# Patient Record
Sex: FEMALE | Race: WHITE | NOT HISPANIC OR LATINO | Employment: FULL TIME | URBAN - METROPOLITAN AREA
[De-identification: names, ages, dates, MRNs, and addresses within clinical notes are randomized per-mention and may not be internally consistent; named-entity substitution may affect disease eponyms.]

---

## 2019-02-06 ENCOUNTER — HISTORICAL (OUTPATIENT)
Dept: ADMINISTRATIVE | Facility: HOSPITAL | Age: 29
End: 2019-02-06

## 2020-05-05 ENCOUNTER — HISTORICAL (OUTPATIENT)
Dept: LAB | Facility: HOSPITAL | Age: 30
End: 2020-05-05

## 2020-09-30 ENCOUNTER — HISTORICAL (OUTPATIENT)
Dept: RADIOLOGY | Facility: HOSPITAL | Age: 30
End: 2020-09-30

## 2020-10-31 ENCOUNTER — HISTORICAL (OUTPATIENT)
Dept: LAB | Facility: HOSPITAL | Age: 30
End: 2020-10-31

## 2020-11-12 ENCOUNTER — HISTORICAL (OUTPATIENT)
Dept: LAB | Facility: HOSPITAL | Age: 30
End: 2020-11-12

## 2020-11-24 ENCOUNTER — HISTORICAL (OUTPATIENT)
Dept: LAB | Facility: HOSPITAL | Age: 30
End: 2020-11-24

## 2021-04-14 ENCOUNTER — HISTORICAL (OUTPATIENT)
Dept: ADMINISTRATIVE | Facility: HOSPITAL | Age: 31
End: 2021-04-14

## 2021-05-07 ENCOUNTER — HISTORICAL (OUTPATIENT)
Dept: ADMINISTRATIVE | Facility: HOSPITAL | Age: 31
End: 2021-05-07

## 2021-05-21 ENCOUNTER — HISTORICAL (OUTPATIENT)
Dept: LAB | Facility: HOSPITAL | Age: 31
End: 2021-05-21

## 2021-11-23 ENCOUNTER — HISTORICAL (OUTPATIENT)
Dept: RADIOLOGY | Facility: HOSPITAL | Age: 31
End: 2021-11-23

## 2022-02-03 ENCOUNTER — HISTORICAL (OUTPATIENT)
Dept: ADMINISTRATIVE | Facility: HOSPITAL | Age: 32
End: 2022-02-03

## 2022-02-03 LAB
FLUAV AG UPPER RESP QL IA.RAPID: NEGATIVE
FLUBV AG UPPER RESP QL IA.RAPID: NEGATIVE
SARS-COV-2 RNA RESP QL NAA+PROBE: NOT DETECTED

## 2022-04-07 ENCOUNTER — HISTORICAL (OUTPATIENT)
Dept: ADMINISTRATIVE | Facility: HOSPITAL | Age: 32
End: 2022-04-07
Payer: COMMERCIAL

## 2022-04-12 LAB
PAP RECOMMENDATION EXT: NORMAL
PAP SMEAR: NORMAL

## 2022-04-23 VITALS
DIASTOLIC BLOOD PRESSURE: 88 MMHG | SYSTOLIC BLOOD PRESSURE: 134 MMHG | BODY MASS INDEX: 45.7 KG/M2 | OXYGEN SATURATION: 99 % | WEIGHT: 284.38 LBS | HEIGHT: 66 IN

## 2022-04-27 ENCOUNTER — HISTORICAL (OUTPATIENT)
Dept: ADMINISTRATIVE | Facility: HOSPITAL | Age: 32
End: 2022-04-27
Payer: COMMERCIAL

## 2022-04-27 ENCOUNTER — HISTORICAL (OUTPATIENT)
Dept: RADIOLOGY | Facility: HOSPITAL | Age: 32
End: 2022-04-27
Payer: COMMERCIAL

## 2022-05-23 ENCOUNTER — HOSPITAL ENCOUNTER (OUTPATIENT)
Dept: RADIOLOGY | Facility: HOSPITAL | Age: 32
Discharge: HOME | End: 2022-05-23
Attending: OBSTETRICS & GYNECOLOGY
Payer: COMMERCIAL

## 2022-05-23 VITALS — WEIGHT: 250 LBS | HEIGHT: 66 IN | BODY MASS INDEX: 40.18 KG/M2

## 2022-05-23 DIAGNOSIS — R92.8 ABNORMAL MAMMOGRAM: ICD-10-CM

## 2022-05-23 PROCEDURE — 76642 ULTRASOUND BREAST LIMITED: CPT | Mod: 26,RT,, | Performed by: RADIOLOGY

## 2022-05-23 PROCEDURE — 76642 ULTRASOUND BREAST LIMITED: CPT | Mod: TC,RT

## 2022-05-23 PROCEDURE — 76642 US BREAST RIGHT LIMITED: ICD-10-PCS | Mod: 26,RT,, | Performed by: RADIOLOGY

## 2022-05-23 PROCEDURE — 77065 DX MAMMO INCL CAD UNI: CPT | Mod: 26,RT,, | Performed by: RADIOLOGY

## 2022-05-23 PROCEDURE — 77061 BREAST TOMOSYNTHESIS UNI: CPT | Mod: 26,RT,, | Performed by: RADIOLOGY

## 2022-05-23 PROCEDURE — 77065 MAMMO DIGITAL DIAGNOSTIC RIGHT WITH TOMO: ICD-10-PCS | Mod: 26,RT,, | Performed by: RADIOLOGY

## 2022-05-23 PROCEDURE — 77065 DX MAMMO INCL CAD UNI: CPT | Mod: TC,RT

## 2022-05-23 PROCEDURE — 77061 MAMMO DIGITAL DIAGNOSTIC RIGHT WITH TOMO: ICD-10-PCS | Mod: 26,RT,, | Performed by: RADIOLOGY

## 2022-06-07 DIAGNOSIS — Z91.89 AT HIGH RISK FOR BREAST CANCER: Primary | ICD-10-CM

## 2022-06-10 ENCOUNTER — OCCUPATIONAL HEALTH (OUTPATIENT)
Dept: URGENT CARE | Facility: CLINIC | Age: 32
End: 2022-06-10
Payer: COMMERCIAL

## 2022-06-10 VITALS — RESPIRATION RATE: 18 BRPM | OXYGEN SATURATION: 97 %

## 2022-06-10 DIAGNOSIS — R05.9 COUGH: Primary | ICD-10-CM

## 2022-06-10 LAB
CTP QC/QA: YES
SARS-COV-2 RDRP RESP QL NAA+PROBE: POSITIVE

## 2022-06-10 PROCEDURE — U0002 COVID-19 LAB TEST NON-CDC: HCPCS | Mod: QW,,, | Performed by: FAMILY MEDICINE

## 2022-06-10 PROCEDURE — U0002: ICD-10-PCS | Mod: QW,,, | Performed by: FAMILY MEDICINE

## 2022-06-10 NOTE — PROGRESS NOTES
Pt being swabbed for COVID as per employee health. Specimen collected by staff and being tested per protocols of [facility] [place] Pt will be notified of results.

## 2022-07-15 ENCOUNTER — OFFICE VISIT (OUTPATIENT)
Dept: URGENT CARE | Facility: CLINIC | Age: 32
End: 2022-07-15
Payer: COMMERCIAL

## 2022-07-15 VITALS
OXYGEN SATURATION: 97 % | SYSTOLIC BLOOD PRESSURE: 137 MMHG | WEIGHT: 250 LBS | BODY MASS INDEX: 40.18 KG/M2 | TEMPERATURE: 98 F | DIASTOLIC BLOOD PRESSURE: 89 MMHG | HEART RATE: 69 BPM | HEIGHT: 66 IN | RESPIRATION RATE: 18 BRPM

## 2022-07-15 DIAGNOSIS — J02.0 STREP THROAT: ICD-10-CM

## 2022-07-15 DIAGNOSIS — J02.9 SORE THROAT: Primary | ICD-10-CM

## 2022-07-15 LAB
CTP QC/QA: YES
S PYO RRNA THROAT QL PROBE: POSITIVE

## 2022-07-15 PROCEDURE — 1159F MED LIST DOCD IN RCRD: CPT | Mod: CPTII,,, | Performed by: NURSE PRACTITIONER

## 2022-07-15 PROCEDURE — 1160F PR REVIEW ALL MEDS BY PRESCRIBER/CLIN PHARMACIST DOCUMENTED: ICD-10-PCS | Mod: CPTII,,, | Performed by: NURSE PRACTITIONER

## 2022-07-15 PROCEDURE — 3008F PR BODY MASS INDEX (BMI) DOCUMENTED: ICD-10-PCS | Mod: CPTII,,, | Performed by: NURSE PRACTITIONER

## 2022-07-15 PROCEDURE — 87880 POCT RAPID STREP A: ICD-10-PCS | Mod: QW,,, | Performed by: NURSE PRACTITIONER

## 2022-07-15 PROCEDURE — 3075F SYST BP GE 130 - 139MM HG: CPT | Mod: CPTII,,, | Performed by: NURSE PRACTITIONER

## 2022-07-15 PROCEDURE — 3008F BODY MASS INDEX DOCD: CPT | Mod: CPTII,,, | Performed by: NURSE PRACTITIONER

## 2022-07-15 PROCEDURE — 87880 STREP A ASSAY W/OPTIC: CPT | Mod: QW,,, | Performed by: NURSE PRACTITIONER

## 2022-07-15 PROCEDURE — 1160F RVW MEDS BY RX/DR IN RCRD: CPT | Mod: CPTII,,, | Performed by: NURSE PRACTITIONER

## 2022-07-15 PROCEDURE — 1159F PR MEDICATION LIST DOCUMENTED IN MEDICAL RECORD: ICD-10-PCS | Mod: CPTII,,, | Performed by: NURSE PRACTITIONER

## 2022-07-15 PROCEDURE — 99203 OFFICE O/P NEW LOW 30 MIN: CPT | Mod: 25,,, | Performed by: NURSE PRACTITIONER

## 2022-07-15 PROCEDURE — 3075F PR MOST RECENT SYSTOLIC BLOOD PRESS GE 130-139MM HG: ICD-10-PCS | Mod: CPTII,,, | Performed by: NURSE PRACTITIONER

## 2022-07-15 PROCEDURE — 99203 PR OFFICE/OUTPT VISIT, NEW, LEVL III, 30-44 MIN: ICD-10-PCS | Mod: 25,,, | Performed by: NURSE PRACTITIONER

## 2022-07-15 PROCEDURE — 3079F PR MOST RECENT DIASTOLIC BLOOD PRESSURE 80-89 MM HG: ICD-10-PCS | Mod: CPTII,,, | Performed by: NURSE PRACTITIONER

## 2022-07-15 PROCEDURE — 3079F DIAST BP 80-89 MM HG: CPT | Mod: CPTII,,, | Performed by: NURSE PRACTITIONER

## 2022-07-15 RX ORDER — PROPRANOLOL HYDROCHLORIDE 60 MG/1
60 CAPSULE, EXTENDED RELEASE ORAL DAILY
COMMUNITY
Start: 2022-06-17 | End: 2023-01-05 | Stop reason: SDUPTHER

## 2022-07-15 RX ORDER — AZITHROMYCIN 250 MG/1
TABLET, FILM COATED ORAL
Qty: 6 TABLET | Refills: 0 | Status: SHIPPED | OUTPATIENT
Start: 2022-07-15 | End: 2022-08-02

## 2022-07-15 RX ORDER — LEVOTHYROXINE SODIUM 200 UG/1
200 TABLET ORAL
COMMUNITY
Start: 2021-10-12 | End: 2022-11-29 | Stop reason: SDUPTHER

## 2022-07-15 RX ORDER — UBROGEPANT 100 MG/1
TABLET ORAL
COMMUNITY
Start: 2022-06-17 | End: 2023-02-20 | Stop reason: SDUPTHER

## 2022-07-15 RX ORDER — METHYLPREDNISOLONE 4 MG/1
TABLET ORAL
Qty: 21 EACH | Refills: 0 | Status: SHIPPED | OUTPATIENT
Start: 2022-07-15 | End: 2022-08-02

## 2022-07-15 NOTE — PROGRESS NOTES
"Subjective:       Patient ID: Sonia Shelley is a 31 y.o. female.    Vitals:  height is 5' 6" (1.676 m) and weight is 113.4 kg (250 lb). Her oral temperature is 98.3 °F (36.8 °C). Her blood pressure is 137/89 and her pulse is 69. Her respiration is 18 and oxygen saturation is 97%.     Chief Complaint: COVID + (End of June), Sore Throat (X this morning), Headache (X this morning), and Fatigue (X this morning)    31-year-old female presents with throat pain, fatigue and headache. Symptoms started x this morning. Pt stated she was COVID + the last week of June.     Sore Throat   This is a new problem. The current episode started today. The problem has been unchanged. There has been no fever. The pain is at a severity of 4/10. The pain is mild. Associated symptoms include headaches. Treatments tried: motrin 800 mg and zicam  The treatment provided mild relief.   Headache   This is a new problem. The current episode started today. The problem has been unchanged. The pain is located in the frontal region. The pain does not radiate. The quality of the pain is described as dull. The pain is at a severity of 5/10. The pain is mild. Associated symptoms include a sore throat. Nothing aggravates the symptoms. Treatments tried: motrin 800 mg and zicam  The treatment provided mild relief.   Fatigue  This is a new problem. The current episode started today. The problem has been unchanged. Associated symptoms include fatigue, headaches and a sore throat. Nothing aggravates the symptoms. She has tried nothing for the symptoms. The treatment provided no relief.       Constitution: Positive for fatigue.   HENT: Positive for sore throat.    Neurological: Positive for headaches.       Objective:      Physical Exam   Constitutional: She is oriented to person, place, and time. She appears well-developed. She is cooperative.  Non-toxic appearance. She does not appear ill. No distress.   HENT:   Head: Normocephalic and atraumatic. "   Ears:   Right Ear: Hearing, tympanic membrane, external ear and ear canal normal.   Left Ear: Hearing, tympanic membrane, external ear and ear canal normal.   Nose: Nose normal. No mucosal edema, rhinorrhea or nasal deformity. No epistaxis. Right sinus exhibits no maxillary sinus tenderness and no frontal sinus tenderness. Left sinus exhibits no maxillary sinus tenderness and no frontal sinus tenderness.   Mouth/Throat: Uvula is midline, oropharynx is clear and moist and mucous membranes are normal. No trismus in the jaw. Normal dentition. No uvula swelling. No oropharyngeal exudate, posterior oropharyngeal edema or posterior oropharyngeal erythema.   Eyes: Conjunctivae and lids are normal. No scleral icterus.   Neck: Trachea normal and phonation normal. Neck supple. No edema present. No erythema present. No neck rigidity present.   Cardiovascular: Normal rate, regular rhythm, normal heart sounds and normal pulses.   Pulmonary/Chest: Effort normal and breath sounds normal. No respiratory distress. She has no decreased breath sounds. She has no rhonchi.   Abdominal: Normal appearance.   Musculoskeletal: Normal range of motion.         General: No deformity. Normal range of motion.   Neurological: She is alert and oriented to person, place, and time. She exhibits normal muscle tone. Coordination normal.   Skin: Skin is warm, dry, intact, not diaphoretic and not pale.   Psychiatric: Her speech is normal and behavior is normal. Judgment and thought content normal.   Nursing note and vitals reviewed.        Assessment:       1. Sore throat    2. Strep throat          Plan:   Increase oral fluids  Warm salt water gargles as instructed  OTC Chloraseptic spray as directed  Ibuprofen or Tylenol OTC for pain as directed  Take prescription medication as directed  Change toothbrush  Follow up PCP or return here for concerns          Sore throat  -     POCT rapid strep A    Strep throat  -     azithromycin (Z-PALMIRA) 250 MG  tablet; Take 2 tablets by mouth on day 1; Take 1 tablet by mouth on days 2-5  Dispense: 6 tablet; Refill: 0  -     methylPREDNISolone (MEDROL DOSEPACK) 4 mg tablet; use as directed  Dispense: 21 each; Refill: 0

## 2022-07-18 ENCOUNTER — OFFICE VISIT (OUTPATIENT)
Dept: SURGERY | Facility: CLINIC | Age: 32
End: 2022-07-18
Payer: COMMERCIAL

## 2022-07-18 VITALS
HEART RATE: 86 BPM | HEIGHT: 66 IN | TEMPERATURE: 98 F | DIASTOLIC BLOOD PRESSURE: 80 MMHG | BODY MASS INDEX: 44.84 KG/M2 | WEIGHT: 279 LBS | RESPIRATION RATE: 18 BRPM | SYSTOLIC BLOOD PRESSURE: 116 MMHG | OXYGEN SATURATION: 95 %

## 2022-07-18 DIAGNOSIS — Z91.89 AT HIGH RISK FOR BREAST CANCER: Primary | ICD-10-CM

## 2022-07-18 DIAGNOSIS — Z80.3 FAMILY HISTORY OF BREAST CANCER: ICD-10-CM

## 2022-07-18 PROCEDURE — 3008F PR BODY MASS INDEX (BMI) DOCUMENTED: ICD-10-PCS | Mod: CPTII,S$GLB,,

## 2022-07-18 PROCEDURE — 99999 PR PBB SHADOW E&M-EST. PATIENT-LVL V: CPT | Mod: PBBFAC,,,

## 2022-07-18 PROCEDURE — 99205 OFFICE O/P NEW HI 60 MIN: CPT | Mod: S$GLB,,,

## 2022-07-18 PROCEDURE — 1159F PR MEDICATION LIST DOCUMENTED IN MEDICAL RECORD: ICD-10-PCS | Mod: CPTII,S$GLB,,

## 2022-07-18 PROCEDURE — 1159F MED LIST DOCD IN RCRD: CPT | Mod: CPTII,S$GLB,,

## 2022-07-18 PROCEDURE — 3074F SYST BP LT 130 MM HG: CPT | Mod: CPTII,S$GLB,,

## 2022-07-18 PROCEDURE — 3079F DIAST BP 80-89 MM HG: CPT | Mod: CPTII,S$GLB,,

## 2022-07-18 PROCEDURE — 3074F PR MOST RECENT SYSTOLIC BLOOD PRESSURE < 130 MM HG: ICD-10-PCS | Mod: CPTII,S$GLB,,

## 2022-07-18 PROCEDURE — 3079F PR MOST RECENT DIASTOLIC BLOOD PRESSURE 80-89 MM HG: ICD-10-PCS | Mod: CPTII,S$GLB,,

## 2022-07-18 PROCEDURE — 99205 PR OFFICE/OUTPT VISIT, NEW, LEVL V, 60-74 MIN: ICD-10-PCS | Mod: S$GLB,,,

## 2022-07-18 PROCEDURE — 3008F BODY MASS INDEX DOCD: CPT | Mod: CPTII,S$GLB,,

## 2022-07-18 PROCEDURE — 99999 PR PBB SHADOW E&M-EST. PATIENT-LVL V: ICD-10-PCS | Mod: PBBFAC,,,

## 2022-07-18 RX ORDER — LEVOTHYROXINE SODIUM 25 UG/1
25 TABLET ORAL DAILY
COMMUNITY
Start: 2022-06-17 | End: 2023-03-14

## 2022-07-18 NOTE — PROGRESS NOTES
[facility] Breast Surg  Breast Surgical Oncology  New Patient Office Visit - H&P      Referring Provider:  Dr. Gentry Arceo     PCP: Luke Harris  PCP    Chief Complaint:   Chief Complaint   Patient presents with    Genetic Evaluation     High Risk Breast Assessment visit. No concerns as of today.      Subjective:      History of Present Illness:  Sonia Shelley  is a pleasant female patient who initially presents on  2022 at 31 y.o. years old for evaluation and assessment of risk for breast cancer based on  the Tyrer - Cuzick Breast Cancer Risk Model (> 20% indicates elevated lifetime risk). Her lifetime risk is calculated to be 30.1%.     She currently denies any breast issues including rashes, redness,swelling, nipple discharge, or new lumps/masses. She states she sometimes has lateral right breast pain that comes and goes with cycles but nothing severe. Imaging was performed to this area and there was no mammographic or sonographic findings to explain the pain.     [place]. [place] [facility] [place] NEEDS ADDITIONAL IMAGING EVALUATION. Right breast upper outer quadrant middle depth focal asymmetry needs further evaluation. BI-RADS 0: Incomplete. Need additional imaging evaluation.   2. 2022 Right [place] and [place] [facility] [place]: BENIGN  No mammographic evidence of malignancy in the right breast.  No sonographic evidence of malignancy in the lateral right breast.  No mammographic or sonographic findings to explain the patient's lateral right breast pain.  The patient was reassured and encouraged to follow-up with her primary care provider.    Pathology:   none    OB / GYN History   Menarche Onset:8  Menopause: Menopause: premenopausal  Hormonal birth control (duration): 3 years  Pregnancies: 3  Age at first child birth: 25  Child births: 3  Hysterectomy/Oophorectomy: none  HRT: no    Family History of Cancer (& age at diagnosis):  Mother  Breast Cancer at age 40  Grandfather maternal skin, prostate at age 50   Great Grandmother maternal Colon at unknown age   Uncle paternal  Leukemia at age 17     Family History   Problem Relation Age of Onset    Breast cancer Mother     Macular degeneration Maternal Grandmother     Emphysema Maternal Grandfather     Prostate cancer Maternal Grandfather     Emphysema Paternal Grandmother         Lifestyle:  Height and Weight: 5'6, 126   BMI: Body mass index is 45.03 kg/m².     Other:  # of breast biopsies (when and pathology results): 0  MG breast density:  BIRADS B  Prior thoracic RT: none  Genetic testing: no  Ashkenazi Jain descent: No    Other History:     Past Medical History:   Diagnosis Date    Migraines     Thyroid disease         Past Surgical History:   Procedure Laterality Date    LYMPH NODE BIOPSY      TONSILLECTOMY          Social History     Socioeconomic History    Marital status:    Tobacco Use    Smoking status: Never Smoker    Smokeless tobacco: Never Used   Substance and Sexual Activity    Alcohol use: Not Currently          There is no immunization history on file for this patient.     Medications/Allergies:       Current Outpatient Medications:     azithromycin (Z-PALMIRA) 250 MG tablet, Take 2 tablets by mouth on day 1; Take 1 tablet by mouth on days 2-5, Disp: 6 tablet, Rfl: 0    levothyroxine (SYNTHROID) 200 MCG tablet, Take 200 mcg by mouth., Disp: , Rfl:     propranoloL (INDERAL LA) 60 MG 24 hr capsule, Take 60 mg by mouth once daily., Disp: , Rfl:     UBRELVY 100 mg tablet, Take by mouth., Disp: , Rfl:     levothyroxine (SYNTHROID) 25 MCG tablet, Take 25 mcg by mouth once daily., Disp: , Rfl:     methylPREDNISolone (MEDROL DOSEPACK) 4 mg tablet, use as directed (Patient not taking: Reported on 7/18/2022), Disp: 21 each, Rfl: 0    Review of patient's allergies indicates:   Allergen Reactions    Penicillins Anaphylaxis    Amoxicillin Hives    Cephalosporins          Review of Systems:      Constitutional: denies fevers, chills, weight loss  HEENT: denies blurry/double vision, changes in hearing, odynophagia, dysphagia  Respiratory: denies cough, shortness of breath  Cardiovascular: denies palpitations, swelling of the extremities  GI: denies abdominal pain, nausea/vomiting, hematochezia, frequent stools  : denies frequency, dysuria, flank pain, hematuria  Skin: denies new rashes  Neurological: denies muscular/sensory deficiencies, loss of coordination, headaches, memory changes  Endo: denies hair loss/thinning, nervousness, hot flashes, heat/cold intolerance, lumps in the neck area  Heme: denies easy bruising and fatigue  Psychological: denies anxious/depressive moods  Musculoskeletal: denies bony pain, muscle cramps, swollen joints       Objective/Physical Exam     Vitals:    07/18/22 0909   BP: 116/80   Pulse: 86   Resp: 18   Temp: 98.2 °F (36.8 °C)        General: The patient is awake, alert and oriented times three. The patient is well nourished and in no acute distress.  Neck: There is no evidence of palpable cervical, supraclavicular or axillary adenopathy. The neck is supple. The thyroid is not enlarged.  Musculoskeletal: The patient has a normal range of motion of her bilateral upper extremities.  Chest: Examination of the chest wall fails to reveal any obvious abnormalities. Nonlabored breathing, symmetric expansion.  Breast:  Right: Examination of right breast fails to reveal any dominant masses or areas of significant focal nodularity. The nipple is everted without evidence of discharge. There is no skin dimpling with movement of the pectoralis. There are no significant skin changes overlying the breast.   Left: Examination of the left breast fails to reveal any dominant masses or areas of significant focal nodularity. The nipple is everted without evidence of discharge. There is no skin dimpling with movement of the pectoralis. There are no significant skin  changes overlying the breast.  Abdomen: The abdomen is soft, flat, nontender and nondistended.  Integumentary: no rashes or skin lesions present  Neurologic: cranial nerves intact, no signs of peripheral neurological deficit, motor/sensory function intact     Assessment and Plan     Patient Active Problem List   Diagnosis    Strep throat       Sonia was seen today for genetic evaluation.    Diagnoses and all orders for this visit:    Family history of breast cancer    At high risk for breast cancer  -     Ambulatory referral/consult to Breast Surgery       -------------------------------------------------------------------------------------------------------------  After the initial clinical evaluation nearly 30 minutes were on counseling the patients regarding the options for management. Risk reduction strategies were discussed.     1. Lifestyle factors: As with other types of cancer, studies continue to show that various lifestyle factors may contribute to the development of breast cancer.     Weight: Recent studies have shown that postmenopausal women who are overweight or obese have an increased risk of breast cancer. These women also have a higher risk of having the cancer come back after treatment.     Physical activity: Decreased physical activity is associated with an increased risk of developing breast cancer and a higher risk of having the cancer come back after treatment. Regular physical activity may protect against breast cancer by helping women maintain a healthy body weight, lowering hormone levels, or causing changes in a womens metabolism or immune factors.     Alcohol: Current research suggests that having more than 1 to 2 alcoholic drinks, including beer, wine, and spirits, per day raises the risk of breast cancer, as well as the risk of having the cancer come back after treatment.     Food: There is no reliable research that confirms that eating or avoiding specific foods reduces the risk of  "developing breast cancer or having the cancer come back after treatment. However, eating more fruits and vegetables and fewer animal fats is linked with many health benefits.     2. Prevention:  Surgery to lower cancer risk: For women with BRCA1 or BRCA2 genetic mutations, which substantially increase the risk of breast cancer, preventive removal of the breasts may be considered. The procedure, called a prophylactic mastectomy, appears to reduce the risk of developing breast cancer by at least 95%. Women with these mutations should also consider the preventive removal of the ovaries and fallopian tubes, called a prophylactic salpingo-oophorectomy. This procedure can reduce the risk of developing ovarian cancer, as well as breast cancer, by stopping the ovaries from making estrogen.      Drugs to lower cancer risk (Chemoprevention): Women who have a higher than usual risk of developing breast cancer may consider certain drugs that may help prevent breast cancer. This approach may also be called "chemoprevention." For breast cancer, this is the use of hormone-blocking drugs to reduce cancer risk. The drugs, tamoxifen (Soltamox) and raloxifene (Evista), are approved by the U.S. Food and Drug Administration (FDA) to lower breast cancer risk. These drugs are called selective estrogen receptor modulators (SERMs) and are not chemotherapy. A SERM is a medication that blocks estrogen receptors in some tissues and not others. Both women who have gone through menopause and those who have not may take tamoxifen. Raloxifene is only approved for women who have gone through menopause. Each drug also has different side effects.     Aromatase inhibitors (AIs) have also been shown to lower breast cancer risk. AIs are a type of hormone-blocking treatment that reduces the amount of estrogen in a woman's body by stopping tissues and organs other than the ovaries from producing estrogen. They can only be used by women who have gone " through menopause. However, no AIs have been approved by the FDA for lowering breast cancer risk in women who do not have the disease.     3. Surveillance: Women at greater than 20 percent average lifetime risk of invasive breast cancer based mainly on family history     Clinical Breast exam: Every 6-12 months starting at age found to be at increased risk by risk model     Mammogram: Every year starting 10 years younger than the youngest breast cancer case in the family (but not before age 30)     Breast MRI: Every year starting 10 years younger than the youngest breast cancer case in the family (but not before age 25). If you have a first-degree relative with a BRCA1/2 gene mutation, youre encouraged to get genetic counseling and/or testing before getting MRI as part of screening (for those who do not wish to have genetic testing, MRI is recommended). Breast MRI in combination with mammography is better than mammography alone at finding breast cancer in certain women at higher than average risk.    -------------------------------------------------------------------------------------------------------------  PLAN:    1. Lifestyle - Healthy lifestyle guidelines were reviewed. She was encouraged to engage in regular exercise, maintain a healthy body weight, and avoid excessive alcohol consumption. Healthy nutritional guidelines were also discussed. Self-breast examination was reviewed with the patient in detail and she was encouraged to perform this on a monthly basis.    2. Surveillance - She desires undergoing high risk screening with annual screening mammograms and breast MRIs. In the absence of significant clinical findings in the interval, I recommend a high risk screening MRI in October, RTC in November. SCR MG in April 2022.     3. Prevention - We had a brief discussion/education about indications for preventative mastectomy or chemoprevention.  These methods are not recommended to her at this time. She is of  child bearing age.     4. Genetics - According to NCCN guidelines, she meets criteria for genetic testing. Referral to genetic counseling placed. Will return in November to go over genetic results.     5. Continue to see OB/GYN for CBE. Recommend two CBE a year. One with us and one with your OB/GYN Provider. We recommend them to be at a six month interval.      6. Please call our office with any questions or concerns that may arise before the next appointment.     7. I discussed ways to reduce breast pain/tenderness. Avoid caffeine (coffee, teas, chocolate, sodas). Drinking alcohol may also increase the risk of fibrocystic changes and breast pain. I also advised to wear a good, supportive bra both day and night when symptoms are worse. Avoid contact sports and other activities which could cause injury to the breasts.Also encouraged the patient to try Vitamin E.     All of her questions were answered.     NICOLA Brown  -------------------------------------------------------------------------------------------------------------  Total time on the date of the visit ranged from 60-74 mins (69169). Total time includes both face-to-face and non-face-to-face time personally spent by myself on the day of the visit.    Non-face-to-face time included:  _X_ preparing to see the patient such as reviewing the patient record  _X_ obtaining and reviewing separately obtained history  _X_ independently interpreting results  _X_ documenting clinical information in electronic health record.    Face-to-face time included:  _X_ performing an appropriate history and examination  _X_ communicating results to the patient  _X_ counseling and educating the patient  __ ordering appropriate medications  _x_ ordering appropriate tests  _X_ ordering appropriate procedures (including follow-up)  _X_ answering any questions the patient had    Total Time spent on date of visit: 60 minutes

## 2022-08-02 ENCOUNTER — OFFICE VISIT (OUTPATIENT)
Dept: HEMATOLOGY/ONCOLOGY | Facility: CLINIC | Age: 32
End: 2022-08-02
Payer: COMMERCIAL

## 2022-08-02 DIAGNOSIS — Z80.3 FAMILY HISTORY OF BREAST CANCER: ICD-10-CM

## 2022-08-02 DIAGNOSIS — Z91.89 AT HIGH RISK FOR BREAST CANCER: ICD-10-CM

## 2022-08-02 DIAGNOSIS — Z80.42 FAMILY HISTORY OF PROSTATE CANCER: Primary | ICD-10-CM

## 2022-08-02 PROCEDURE — 99205 OFFICE O/P NEW HI 60 MIN: CPT | Mod: 95,,, | Performed by: NURSE PRACTITIONER

## 2022-08-02 PROCEDURE — 99205 PR OFFICE/OUTPT VISIT, NEW, LEVL V, 60-74 MIN: ICD-10-PCS | Mod: 95,,, | Performed by: NURSE PRACTITIONER

## 2022-08-02 NOTE — PROGRESS NOTES
REFERRING PHYSICIAN: Viviana Blake NP    Subjective:       Patient ID: Sonia Shelley is a 31 y.o. female.    Chief Complaint: No personal history of cancer but a family history of cancer. Patient presented via Telemedicine Visit (audio and video) today for risk assessment, genetic counseling, and consideration for genetic testing.    Past Medical History:   Diagnosis Date    Migraines     Thyroid disease      Past Surgical History:   Procedure Laterality Date    LYMPH NODE BIOPSY      TONSILLECTOMY       Review of patient's allergies indicates:  Penicillins, Amoxicillin, and Cephalosporins       Review of Systems   Respiratory: Negative for cough and shortness of breath.    Cardiovascular: Negative for chest pain.   Gastrointestinal: Negative for abdominal pain and diarrhea.   Genitourinary: Negative for frequency.   Musculoskeletal: Negative for back pain.   Skin: Negative for rash.   Neurological: Negative for headaches.   Psychiatric/Behavioral: The patient is not nervous/anxious.      Oncology History    No history exists.      Family History   Problem Relation Age of Onset    Breast cancer Mother 40    Macular degeneration Maternal Grandmother     Emphysema Maternal Grandfather     Prostate cancer Maternal Grandfather 50    Skin cancer Maternal Grandfather     Emphysema Paternal Grandmother     Skin cancer Maternal Aunt     Brain cancer Other     Leukemia Paternal Uncle 17          Assessment:     Risk Assessment:  This patient is at increased risk of having a genetic mutation that increases the risk of cancer. She meets criteria for genetic testing based on the National Comprehensive Cancer Network (NCCN) criteria due to a family history that includes breast cancer diagnosed under 50y (mother dx40y) and prostate cancer on the same side of the family (maternal grandfather)  (see family history and pedigree). Based on her likelihood of having a mutation, BRCA1/2 Analysis with [facility]  testing through [employer] was described in detail.    Education and Counseling:  [facility] is a gene panel that evaluates a broad number of hereditary cancer syndromes to help define patients' cancer risk with a focus on eight primary cancer sites (breast, ovarian, gastric, colorectal, pancreatic, melanoma, prostate, and endometrial). This test is appropriate for patients that meet criteria for genetic testing for Breast and Ovarian Cancer Syndrome. This panel will test for genes that increase cancer risk APC, NITIN, AXIN2, BAP1, BARD1, BMPR1A, BRCA1, BRCA2, BRIP1, CDH1, CDK4, CDKN2A, CHEK2, CTNNA1, FH, FLCN, HOXB13 (seq only), MEN1, MET, MLH1, MSH2, MSH3 (excluding repetitive portions of exon 1), MSH6, MUTYH, NTHL1, PALB2, PMS2, PTEN, RAD51C, RAD51D, SDHA, SDHB, SDHC, SDHD, SMAD4, STK11, TP53, TSC1, TSC2, VHL.    Risks of cancer associated with inherited cancer predisposition mutations were discussed in detail.  If a mutation were found, this patient would have a significantly increased risk for cancer.  It has been shown that individuals with a BRCA1 or BRCA2 mutation face a 56-87% lifetime risk of breast cancer and a 27-44% lifetime risk of ovarian cancer. Mutation carriers who have had breast cancer have a 20% (BRCA1) or 12% (BRCA2) chance of developing a second breast cancer within 5 years, and up to 64% (BRCA1) or 52% (BRCA2) of a second breast cancer by age 70. Mutation carriers have up to a 5% risk of pancreatic cancer, as well as increased risk for other cancers such as colon cancer and lymphoma. Other inherited cancer syndromes that are also included in the myRisk test, may have different, but still significant risk for cancer.    The availability of clinical management options for inherited cancer predisposition mutation carriers was discussed, including increased surveillance, chemo prevention, and prophylactic surgery. Details of the testing process, including benefits and  limitations of genetic analysis as well as the implications of possible test results, were discussed.  Because this patient is the first member of her family to be tested comprehensive testing was presented.  Related insurance issues were discussed.      Summary:  This patient was evaluated for hereditary risk of cancer and was found to be at an increased risk of having an inherited cancer predisposition mutation.  The option of genetic testing was explained in detail, including the possible impact of this information on family members.  Since this patient wishes to proceed with testing an informed consent will be obtained and blood drawn and sent to [facility].  Results will be expected 4 weeks from this time.  A follow-up appointment will be scheduled for results disclosure.        The patient location is: work in [place], [place].    Visit type: audiovisual    Face to Face time with patient: 35 minutes  >60 minutes of total time spent on the encounter, which includes face to face time and non-face to face time preparing to see the patient (eg, review of tests), Obtaining and/or reviewing separately obtained history, Documenting clinical information in the electronic or other health record, Independently interpreting results (not separately reported) and communicating results to the patient/family/caregiver, or Care coordination (not separately reported).         Each patient to whom he or she provides medical services by telemedicine is:  (1) informed of the relationship between the physician and patient and the respective role of any other health care provider with respect to management of the patient; and (2) notified that he or she may decline to receive medical services by telemedicine and may withdraw from such care at any time.      NASREEN CARTER, PhD    Answers for HPI/ROS submitted by the patient on 7/31/2022  appetite change : No  unexpected weight change: No  mouth sores: No  visual  disturbance: No  cough: No  shortness of breath: No  chest pain: No  abdominal pain: No  diarrhea: No  frequency: No  back pain: No  rash: No  headaches: No  adenopathy: No  nervous/ anxious: No

## 2022-08-04 DIAGNOSIS — C50.919 MALIGNANT NEOPLASM OF FEMALE BREAST, UNSPECIFIED ESTROGEN RECEPTOR STATUS, UNSPECIFIED LATERALITY, UNSPECIFIED SITE OF BREAST: Primary | ICD-10-CM

## 2022-08-04 PROCEDURE — 36415 COLL VENOUS BLD VENIPUNCTURE: CPT | Performed by: NURSE PRACTITIONER

## 2022-08-24 ENCOUNTER — OFFICE VISIT (OUTPATIENT)
Dept: HEMATOLOGY/ONCOLOGY | Facility: CLINIC | Age: 32
End: 2022-08-24
Payer: COMMERCIAL

## 2022-08-24 DIAGNOSIS — Z80.3 FAMILY HISTORY OF BREAST CANCER: Primary | ICD-10-CM

## 2022-08-24 PROCEDURE — 99213 OFFICE O/P EST LOW 20 MIN: CPT | Mod: 95,,, | Performed by: NURSE PRACTITIONER

## 2022-08-24 PROCEDURE — 99213 PR OFFICE/OUTPT VISIT, EST, LEVL III, 20-29 MIN: ICD-10-PCS | Mod: 95,,, | Performed by: NURSE PRACTITIONER

## 2022-08-24 NOTE — PROGRESS NOTES
REFERRING PHYSICIAN: Viviana Blake NP    Subjective:       Patient ID: Sonia Shelley is a 31 y.o. female.    Chief Complaint: No personal history of cancer but a family history of cancer. Patient presented for genetic counseling on 8/2/2022 and was found appropriate for genetic testing based on the National Comprehensive Cancer Network (NCCN) criteria due to a family history that includes breast cancer diagnosed under 50y (mother dx40y) and prostate cancer on the same side of the family (maternal grandfather)  (see family history and pedigree). She signed consent, lab was drawn and sent to [facility] for BRCA1/2 Analysis with myRisk panel testing. She presented via Telemedicine Visit (audio and video) today for results disclosure.    HPI  Past Medical History:   Diagnosis Date    Migraines     Thyroid disease       Past Surgical History:   Procedure Laterality Date    LYMPH NODE BIOPSY      TONSILLECTOMY       Review of patient's allergies indicates:   Allergen Reactions    Penicillins Anaphylaxis    Amoxicillin Hives    Cephalosporins       Review of Systems   Constitutional: Negative for appetite change and unexpected weight change.   HENT: Negative for mouth sores.    Eyes: Negative for visual disturbance.   Respiratory: Negative for cough and shortness of breath.    Cardiovascular: Negative for chest pain.   Gastrointestinal: Negative for abdominal pain and diarrhea.   Genitourinary: Negative for frequency.   Musculoskeletal: Negative for back pain.   Integumentary:  Negative for rash.   Neurological: Negative for headaches.   Hematological: Negative for adenopathy.   Psychiatric/Behavioral: The patient is not nervous/anxious.           Family History   Problem Relation Age of Onset    Breast cancer Mother 40    Macular degeneration Maternal Grandmother     Emphysema Maternal Grandfather     Prostate cancer Maternal Grandfather 50    Skin cancer Maternal Grandfather      Emphysema Paternal Grandmother     Skin cancer Maternal Aunt     Brain cancer Other     Leukemia Paternal Uncle 17     Assessment:   Genetic testing was appropriate for this patient because of her personal and family history. Analysis consisted of sequencing and large rearrangement analyses of the following genes: APC, NITIN, BARD1, BMPR1A, BRCA1, BRCA2, BRIP1, CDH1, CDK4, CDKN2A (p14ARF and n05xod9a), CHEK2, EPCAM (large rearrangement only), GREM1, MLH1, MSH2, MSH6, MUTYH, NBN, PALB2, PMS2, POLE, POLD1, PTEN, RAD51C, RAD51D, SMAD4, STK11, TP53. . This comprehensive [facility] genetic analysis indicated that there was no deleterious mutation found in any of these genes. 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 Variants of this type may or may not affect the function of the protein encoded by the gene in which it is found. There are currently insufficient data to determine if this variant causes increased cancer risk. Therefore, the contribution of this variant to the relative risk of cancer cannot be established from this analysis. If as a result of ongoing reclassification efforts, [facility] should determine that this variant becomes clinically actionable, an amended report will be sent to me as healthcare provider. I will then contact the patient for a discussion of implications of this new information and a healthcare plan will be made accordingly.    I recommended that her mother have testing. Results of her test could add meaning to Ms. Shelley' result. She will discuss with her mother.    A copy of her result will be emailed to Ms. Shelley: [email or web address]    The patient location is: home    Visit type: audiovisual    Face to Face time with patient: 10 minutes.  20 minutes of total time spent on the encounter, which includes face to face time and non-face to face time preparing to see the patient (eg, review of tests), Obtaining and/or  reviewing separately obtained history, Documenting clinical information in the electronic or other health record, Independently interpreting results (not separately reported) and communicating results to the patient/family/caregiver, or Care coordination (not separately reported).         Each patient to whom he or she provides medical services by telemedicine is:  (1) informed of the relationship between the physician and patient and the respective role of any other health care provider with respect to management of the patient; and (2) notified that he or she may decline to receive medical services by telemedicine and may withdraw from such care at any time.        NASREEN CARTER, PhD

## 2022-09-16 ENCOUNTER — CLINICAL SUPPORT (OUTPATIENT)
Dept: PRIMARY CARE CLINIC | Facility: CLINIC | Age: 32
End: 2022-09-16
Payer: COMMERCIAL

## 2022-09-16 ENCOUNTER — PATIENT MESSAGE (OUTPATIENT)
Dept: PRIMARY CARE CLINIC | Facility: CLINIC | Age: 32
End: 2022-09-16

## 2022-09-16 DIAGNOSIS — Z71.3 NUTRITIONAL COUNSELING: Primary | ICD-10-CM

## 2022-09-16 PROCEDURE — 97802 PR MED NUTR THER, 1ST, INDIV, EA 15 MIN: ICD-10-PCS | Mod: 95,,,

## 2022-09-16 PROCEDURE — 97802 MEDICAL NUTRITION INDIV IN: CPT | Mod: 95,,,

## 2022-09-16 NOTE — PATIENT INSTRUCTIONS
Name:  Sonia Shelley  Date:  9/16/2022      Recommended Daily Energy Requirements:   1,800 - 1,900 calories   140 - 150 grams  protein   160 - 170 grams carbohydrates   50 - 60 grams fat  Focus on plant-based fats  130 - 140 fluid oz per day    No more than 20 grams added sugar per day      Nutrition Tips & Goals:     Aim to eat or drink within 1-2 hours of waking; especially to contain a protein source  Frequent fueling: Aim for small meal or snack every 3 or so hours   Metabolism, energy, curb cravings  Emphasis on lean protein + fiber-rich carb (veg, fruit or whole grain) + plant-based fat   If/when choose grains and starches, choose 100% whole grains + fiber-rich starches such as legumes, quinoa, whole wheat pasta, sweet potatoes, 100% whole grain bread, etc.   Nutrition bars + snacks: Look for products with <7 grams added sugar and 7+ grams protein (Think Mitchel #7 for aisle products)    Note: One serving (15-20 grams carbs)  =  1 cup fruit, cow's milk or plain yogurt,   ½ cup cooked grains, ½ cup starchy veggies (corn, peas, potatoes), 1 slice bread      Hydration: Drink at least ½ of your body weight in ounces of fluids daily + addition 16-24 ounces per pound of sweat lost via exercise.  [EasyPost miguelito for water reminder]  Exercise: Aim for 60 minutes most days or aim to obtain 10,000 steps per day throughout work day - ideally starting day with 30-45 minutes of exercise  Aim for a minimum of 7-8 hours sleep nightly  Keep a daily food record       Restaurant Tips:        Pick 2 out of the 5 Rule:  Instead of eating bread (or tortilla chips), an appetizer, alcoholic drink and dessert, choose just one to have with your entrée  Focus on lean proteins: Refer to lean protein page in meal plan guidebook      Select items grilled, baked, broiled, braised, poached or roasted      Avoid crispy, crunchy, breaded, paneed or stuffed items      Avoid items that are cream based, au gratin or  buttered      Order sauces, dressings and gravies on the side. This way you can add 1-2 Tbsp yourself  instead of the dish being 'drowned' in the sauce  = portion control + saving calories while still enjoying the dish      Watch out for high calorie salad additives à   A better-for-you salad consists of unlimited non-starchy veggies, lean protein and 2-3 salad additions, each additive being ~2 Tbsp (See below in notes for examples)       Hold the starchy side dish - request extra non-starchy vegetables instead      Order vegetables steamed or stir-fried instead of sautéed to avoid excess oils. Ask for olive oil on the side and drizzle over veggies instead      Don't drink your calories: avoid sugary soft drinks, juices and mixers  Note: even 100% fruit juice contains the same sugar as a soft drink     [facility] Eat Fit AMRIK à Designed to take the guesswork out of dining out healthfully, to make the healthy choice the easy choice  www.eatfitnola.com  Eat Fit Cookbook  [facility] Eat Fit miguelito à Free miguelito for [employer]  [employer] and [employer]ats offer Eat Fit options  Provides a list of all Eat Fit restaurants & dishes by location  Lists what dishes are Eat Fit at each restaurant  Lists the nutrition facts for each Eat Fit dish  Provides 200 + Eat Fit approved recipes  Grocery shopping guides + community wellness resources    -Salad additives: Pick 2-3, each being ~2 Tbsp:  Dressing  Cheese  Nuts  Barney  Dried fruit  Avocado  Guacamole  Eggs    How to make a healthy smoothie:  Choose a protein source:  At least 6 oz Plain Greek yogurt or 2% cottage cheese  Examples of plant-based protein powders:  Mary Fenton  Ayers  Garden of Life RAW  100% whey protein powder  2 scoops Vital Proteins Collagen Peptides  Add a piece of fruit -or- 1 cup chopped fresh or frozen unsweetened fruit  Add any non-starchy veggies  Choose a fat source (1-2 Tbsp)  Nut Butter (except Nutella)  ½ Avocado   Avocado oil   Extra Virgin Olive Oil  Choose  a liquid:  Unsweetened almond milk  hemp milk  cashew milk  coconut milk  [facility] lactose free milk (skim, 1% or 2%) -or- Organic Valley ULTRA reduced fat milk (lactose free)  Water   Healthy add-ins for extra nutritional boost:  1-2 Tbsp Flaxseeds or Yimi seeds  Add ice and blend!   To step it up a notch, use frozen PLAIN cauliflower florets in place of ice to provide more nutrients    What may cause inflammation/flare ups in our body/decrease our Energy?  White/refined carbohydrates  Sugar  Fried food  Alcohol      Sample Meal Plan:    Breakfast: 1-2 servings of carbs = 30-40 grams + at least 15 grams lean protein/heart healthy fat  1 slice 100% whole grain bread (Ex: Terry)+ ½  small avocado + 1 egg  1 slice 100% whole grain bread + 1-2 Tbsp PB/nut butter  100% whole wheat English muffin + 2 eggs with a sprinkle of cheese on top   ½ to 1 cup plain cooked oatmeal + 1-2 Tbsp PB stirred in + 1 Tbsp flaxseed  1 packet weight control oatmeal + ½ -1 scoop protein powder  [employer]'s [place] GF Oatmeal with Flax & Yimi (grab and go oatmeal cup)  Evol frozen breakfast sandwich  Omelet: 2 eggs + sprinkle of cheese + ¼- ½ avocado + non-starchy veggies + fruit  High protein, fiber rich cereals: Nutritious living hi-lo, kasha go lean, Natures path optimum, special K protein    Snack: 1 serving of carbs = 15-20 grams + at least 10-15 grams lean protein/heart healthy fat  Needed if going >3-4 hours between meals (See below snack for options)    Lunch: 1-2 servings of carbs =20-40 grams = ½ cup to 1 cup cooked starch + palm size thickness lean protein + non-starchy veggies  See picture below in notes as a guide  Refer to meal plan guide book for list of lean proteins, whole grain carbohydrates and non-starchy veggies      Snack: 1 serving of carbs = 15-20 grams + at least 10-15 grams lean protein/heart healthy fat  Fruit of choice (1 piece or size of a tennis ball, i.e. orange, pear, peach, etc or 1 cup melon/berries) +  protein/healthy fat:  Nuts (~ ¼ cup)  Nut butter (1-2 Tablespoons)  Cheese (reduced fat, light, part-skim, 2% cheese options)  Jerky (see grocery list for recommended brands)  hard boiled egg/s (1 yolk)   Veggies + ½ cup chicken or tuna salad  Flavored Greek Yogurt (no added sugar): Dannon oikos triple zero, Chobani Less Sugar, Two Good,   [place] unsweetened no sugar added 10 gram protein yogurt (plant based yogurt option)  Plain Greek yogurt + Truvia or Swerve (for sweetness) + flavor ( ¾ cup fruit, 2 Tbsp granola, ¼ - ½ cup Kashi Go Lean, extracts, etc)  Protein bar (less than 7 grams added sugar; 10-20 grams protein; ~ 150-200 calories)  Nature Valley Protein Chewy Bar  Powercrunch  Oatmega  Rx  Quest  Kind PROTEIN  EPIC bar  Ready to Drink Protein Drink (less than 7 grams sugar + 20-30 grams protein)   Iconic  Premier  Orgain  1 serving of Beanito chips + 1, 100-calorie wholly guacamole packet or 1/4 cup shredded cheese  [place]ion: 1-2 slices 100% whole grain bread + smear of burgos + 3 oz lean protein (refer to meal plan guide book)  [place] balance break  Any breakfast can be a snack    Dinner: Limit carbs for dinner  Aim for palm size/thickness lean protein + at least 1 cup non-starchy veggies + small amount of heart healthy fats  Kabobs, stuffed bell peppers with no rice, Cauliflower 'rice' stir zamorano  Carb swaps:  House Foods Tofu Shirataki noodles (found in produce section of grocery stores)  Spaghetti Squash  Hearts of palm 'noodles'  Cauliflower rice  Broccoli Rice  Zoodles  Beet Zoodles  Crepini mini egg white thins (protein egg wrap)                  PRODUCE  [] All fresh fruit   [] All fresh vegetables   [] All fresh herbs  [] All herb purees + pastes  [] Pre-spiralized vegetable noodles   [] Steam-In-The-Bag begetables  [] Riced cauliflower  [] Jicama sticks  [] Love Beets  all varieties  [] Wholly Guacamole  all varieties  [] Hummus  all varieties, chickpea + vegetable  [] Bill toney  "   [] Tofu  all varieties  [] Tempeh  all varieties    PROTEIN  CHICKEN   [] Boneless, skinless breasts  [] Boneless, skinless thighs  [] Ground chicken breast, at least 93% lean  [] Chicken breast cutlet  [] Aidell's  Chicken Sausage + Chicken Meatballs    TURKEY   [] Turkey breast tenderloin   [] Ground turkey breast, at least 93% lean  [] Dwayne Naturals  Turkey Sausage    BEEF  [] Tenderloin  [] Sirloin  [] Top Loin  [] Flank Steak  [] Round Steak  [] Filet  [] Lean ground beef, at least 93% lean + grass-fed preferable    PORK  [] Tenderloin  [] Pork Chop  [] Center Cut  [] [place] Naturals  No-Sugar Barney    BISON  [] [place]  90 - 95% lean    SEAFOOD  [] All fresh fish + seafood; locally sourced when possible  [] Smoked salmon    HEAT + EAT ENTREES   [] Fredy's Natural Foods  Chicken, Pork, Beef  [] Krish  "All Natural" Grilled Chicken Breast + Strips, all varieties    SAUCES SPREADS + DIPS  [] Bitchin Sauce  Original, Chipotle, Cilantro [place]  [] Ryan's Kitchen  Tzatziki Yogurt Dip, Babaganoush, Hummus  [] Wholly Guacamole  all varieties  [] Hummus  all varieties  [] [place] Gringo Salsa  all varieties  [] Mrs. Savana's Salsa  all varieties  [] Stubb's All Natural BBQ Sauce  [] Primal Kitchen  De [place] Cruz, Ketchup, BBQ Sauce  [] Primal Kitchen Pasta Sauce  Roasted Garlic, Tomato Basil, no-dairy Vodka Sauce  [] Sal & Tammie's  HeartSmart Pasta Sauce    DAIRY/DAIRY SUBSTITUTES/EGGS  EGGS   [] All eggs  cage-free, pasture-raise preferable  [] Crepini  egg wraps  [] Vital Farms  Pasture-Raised Egg Bites  [] JUST Egg [vegan]     CREAMERS   [] [place]ia  Better Half, original + vanilla unsweetened  [] NutPods  all varieties    MILK   [] Horizon Organic  all varieties except chocolate  [] Organic Valley  all varieties except chocolate  [] Organic Valley  ultra-filtered, reduced fat milk     PLANT_BASED MILK ALTERNATIVES  [] All unsweetened almond milks  original, vanilla + chocolate  [] " Ripple  unsweetened   [] Milkadamia  original +_ vanilla, unsweetened   [] Forager  original + vanilla, unsweetened   [] Silk Organic  soy milk, unsweetened  [] Oatly  unsweetened  [] Califia  regular + protein-fortified oat milk, unsweetened     CHEESES  [] Regular or reduced fat cheeses  [] BelGioso  Fresh Mozzarella Snack Packs, Parmesan Power-full Snack   [] Goat cheese  [] Fresh mozzarella  [] String cheese  all varieties  [] Sylvia Cottage Cheese  [] Corrine's Cultured Cottage Cheese  [] Camilla Life 'Just Like Mozzarella'  plant-based shreds and other varieties  [] Parmela Creamery  plant-based shredded cheese    YOGURT  [] Fage  2% low-fat, plain  [] Siggi's  plain, vanilla  [] Chobani Greek  nonfat + whole milk yogurt, plain   [] Chobani Less Sugar  all flavored varieties   [] Oikos Greek  nonfat, plain  [] Two Good  all varieties   [] [facility] Provisions  plain  [] [place] Organic  low-fat + nonfat, plain  [] [facility] Farm  goat milk yogurt, plain  [] Kefit  unsweetened, plain  [] Forager  Greek style unsweetened, plain [dairy-free]  [] [place]  unsweetened Greek style, plain [dairy-free]  [] [facility]  almond milk yogurt, vanilla or plain, unsweetened [dairy-free]    FREEZER SECTION  FROZEN VEGGIES  [] All plain frozen veggies + greens [e.g. broccoli, brussels, carrots, okra, mushrooms, zucchini, yellow squash, butternut squash, kale, spinach, price greens]  [] Riced veggies [e.g. cauliflower, broccoli, butternut squash]  [] Edamame  all varieties  [] Green Giant  [] Veggie Spirals  [] Marinated Veggies [e.g. eggplant, peppers, zucchini]  [] Simply Steam [place] Sprouts  [] Birds Eye  [] Power Blend Italian Style  lentils, broccoli, kale, zucchini  []  [place] Sprouts & Carrots  [] Oven Roasters Broccoli & Cauliflower  [] [place] Blend  [] Tattooed   [] Green Bean Blend  [] Farmer's Market Ratatouille  [] Butter Balsamic Glazed Vegetables  [] Riced  Cauliflower & Quinoa Mediterranean Style  [] Mindy's Good Life  Southern Style Greens [sauteed kale + onion]    FROZEN FRUITS  [] All unsweetened frozen fruits  all varieties  [] Dole Fruits & Veggie Blends  Berries 'n Kale  [] Dole Mix-ins  Triple Berry     FROZEN ENTREES  [] The Good Kitchen meals  all varieties [ e.g. Chili Lime Chicken Over Riced Cauliflower]  [] Premium Paleo  Not Jomar Momma's Meatloaf  [] Primal Kitchen  Chicken Pesto + Steak Fajitas w/ Peppers & Onions  [] Eating Well Frozen Entrees  Butter Chicken Masala, Steak Carne Asada, Creamy Pesto Chicken, Chicken + Wild Rice Stroganoff, Yellow Mcdonald Chicken, Sun-dried Tomato Chicken, Chicken Lo Mein  [] Realgood Entree Bowls  Occitan Inspired Beef Bowl over Riced Cauliflower, Chicken Burrito Bowl   [] Great Karma Coconut Mcdonald  [] Rosalind's  [place]ale Richie with Black Beans, Vegetable Lasagna  [] Kashi Mayan [place] Bake  [] Healthy Choice  Simply Steamers Chicken Fried Rice  [] Basil Pesto Chicken & Togolese Style Pork Power Bowls  [] Tattooed   Enchilada Bowl  [] Nabil DriveFactor  Spicy Black Bean Burgers    FROZEN PIZZAS  [] Cauli'flour Foods  Cauliflower Pizza Crusts  [] Outer Aisle  Cauliflower Crust  [] Rosalind's  Veggie Crust Cheese Pizza  [] Quest Pizza     VEGETARIAN PRODUCTS  [] Beyond Meat  ground 'meat' + grilled 'chicken' strips  [] Tofurkey  Original Italian Sausage + Original Tempeh  [] Gardein  Beefless Ground + Meatless Meatballs  [] Nabil Farms Grillers  Original Burger, Crumbles, Meatballs    ICE CREAMS + FROZEN DESSERTS  [] Halo Top  regular + keto series, pops  [] Rebel  ice cream + dessert sandwiches  [] Enlightened  ice cream + bars  [] Nightfood  ice cream  [] Realgood  ice cream  [] Arctic Zero Fit  frozen pint  [] The Frozen Farmer  sorbets  [] Wholly Rollies  Protein Balls, all varieties  [] Dream Pops  Coconut Latte    FROZEN BREAKFASTS  [] Realgood  Breakfast Sandwiches on Cauliflower Cheesy  Bread  [] Rebel  ice cream + dessert sandwiches  [] Enlightened  ice cream + bars  [] Nightfood  ice cream  [] Realgood  ice cream  [] Arctic Zero Fit  frozen pint  [] The Frozen Farmer  sorbets  [] Wholly Rollies  Protein Balls, all varieties  [] Dream Pops  Coconut Latte    BREADS/BUNS/WRAPS  [] Terry Bread: All Types - In Freezer Section   [] Flat Out Light Wraps - All Varieties   [] Flat Out Protein Up Carb Down Flat Bread   [] Kontos Whole Wheat Pocket Eugenia   [] Juan AMRIK 100% Whole Wheat Tortillas   [] LaTortilla Factory Tortillas - Smart & Delicious; 50 or 80-calorie   [] Nature's Own 100% Whole Wheat Bread   [] Orowheat Healthful - 100% Whole Wheat Slice Bread and [place] Thins   [] Orowheat Healthful - Whole Wheat Nuts & Grain Bread; Flax & Seed Bread   [] Pepperidge Farm Natural Whole Grain 15 Bread   [] Pepperidge Farm Natural Whole Grain English Muffin - 100% Whole Wheat   [] Pepperidge Farm Very Thin 100% Whole Wheat   [] Sienna Boyd 45 Calories and Delightful   [] Gatito' 100% Whole Wheat Thin-Sliced Bagels and English Muffins   [] Western Bagel: Perfect 10     GLUTEN FREE  [] Bubba's Gluten Free Bread   [] Ingham Bakehouse 7-Grain Gluten Free Bread     LEGUME PASTA   [] Explore Asian Organic Black Bean Spaghetti   [] Modern Table   [] Tolerant Foods       NUT BUTTERS & JELLIES    NUT BUTTERS   [] Better'n Chocolate: Coconut Chocolate Peanut Butter Spread   [] Better'n Peanut Butter - All Types   [] Earth Balance Coconut and Peanut Spread   [] Kory's Nut [place]   [] MaraNatha: All Natural Roasted Cashew Butter - [place] or Creamy   [] MaraNatha: Roasted Peanut Butter   [] Nuts 'N More Peanut [place] - All Flavors   [] PB2 Powder - Original or Chocolate   [] Skippy Natural - Creamy, Super Chunk   [] Smart Balance Peanut Butter - [place] or Creamy   [] Peanut Butter & Company:   [] Smooth , Crunch Time, The Heat Is On, Old Fashioned Smooth, Mighty Nut- Powdered Peanut Butter, Squeeze Pack    [] Smucker's Natural Peanut Butter - Bonnie or Creamy   [] Sunbutter Nut Butter   [] Wild Friends Protein Peanut Butter/[place] o Butter - Vanilla or Chocolate     JELLIES  o Polaner's All Fruit   o Clearly Organic Best Choice: Strawberry Fruit Spread       SNACKS    BARS  [] Kashi Bars - Chewy or Crunchy; Honey [place] o Flax or Peanut Butter   [] KIND Bars - 5 Grams of Sugar or Less   [] KIND Protein Bars - Strong and KIND   [] [facility] Protein Bar - All Varieties   [] Nature [place] Roasted Nut Crunch - [place] Crunch; Peanut Crunch   [] [facility] Simple Nut Bar - Roasted Peanut & Honey   [] [facility] Simple Nut Bar - [place], Cashew & Sea Salt   [] [facility] Nut [place] Bar - Salted Caramel Peanut   [] Think Thin Protein Bars   [] Quest Bars, Power Crunch Bars, Pure Protein Bars     BEEF JERKY - NITRATE FREE   [] Game On   [] Grass Run Farms   [] Krave   [] Ostrim   [] Perky Jerky   [] Primal Strips Meatless Vegan Jerky   [] [place]     CHIPS   [] Beanitos Chips   [] Fruit Crisps - e.g. Brother's-All-Natural, Bare Fruit, Yoga Chips   [] Berna's Soy Crisps: 1.3 ounce bag   [] Quest Protein Chips   [] Wasa Whole Wheat Crisp Bread     CRACKERS  [] Ashley's Gone Crackers   [] Nabisco Triscuit: Regular and Thin Crisp Crackers   [] Vans Say Cheese Crackers (G-F)     POPCORN/NUTS   [] Jimy Valenzuela's Smart Pop Popcorn - Single Serving   [] 100-Calorie Pack of Nuts - All Varieties     PROTEIN POWDERS & DRINKS  []  Protein -  Whey Protein Powder   [] Garden of Life Raw Protein Powder   [] Iconic Ready-To-Drink Protein Drink   [] Ayers One Protein Powder   [] VegaSport Protein Powder     SALSA/HUMMUS/DIPS   [] Eat Well Embrace Life: Zesty Sriracha Carrot o Hummus   [] Pre-Portioned Guacamole Packs   [] Brandyn's   [] Tostitos Restaurant Style Salsa       SOUPS   [] Rosalind's Organic Soups - Lentil, Vegetable, Split Pea, Low-Sodium     CANNED GOODS   [] 100% Pure Pumpkin   [] BlueRunner Creole  Cream-Style Red Beans or Navy Beans   [] Cajun Power Chicken Gumbo Base   [] Chicken of the Sea Rufus [place]   [] Edgar Fresh Cut Sliced Beets   [] Hormel Breast of Chicken in Water   [] LeSuer Tender Baby Whole Carrots   [] Nova Tabasco [place] Starter   [] StarKist: Chunk Lite Tuna in Water, Gourmet Select Pouches   [] StarKist: [place] Tuna Fillets   [] Trappey's: Kidney, Butter, Vega, Black Eye, Field, and Black Beans   [] VALERY Contreras's Turnip Greens or Manuel Spinach     CONDIMENTS/ SAUCES/SPREADS/ SPICES  [] Brendan Ferris's Magic Seasonings - Regular or Salt Free   [] Noman Sanches's Sauces - All Flavors   [] Laughing Cow Light - All Flavors   [] Dash Salt-Free Marinade - All Flavors   [] Roman & Tammie's: Heart Smart Pasta Sauces   [] Tabasco     SALAD DRESSINGS  [] Maricruz's Naturals: Lite Honey Mustard   [] Jonnathan's Own: Lighten Up Salad Dressing - All Varieties   [] OPA Greek Yogurt Dressings - Ranch, Blue o Cheese, Caesar, Feta Dill     SWEETENERS  [] Sweet [place] Sweetener   [] Swerve   [] Truvia     BEVERAGES  [] Coconut Water   [] Crystal Light PURE - All Flavors   [] Honest Tea: Just Green Tea, Unsweetened   [] Kombucha Tea   [] [place]   [] [facility] Bloody Ashley Mix   [] Metromint - Zero-Sugar; All Natural Flavored   [] Anisa - Plain or Flavored   [] Renetta Meyerer   [] Steaz - Zero-Sugar, All-Natural, Sparkling Tea   [] Tea Bags: Any Brand - e.g. Danielle, Yogi, Tazzo, Celestial   [] V8 100% Vegetable Juice   [] Vitamin Water Zero   [] Water   [] Zevia - Stevia Sweetened Soft Drink     BEER/SYLVIE/LIQUORS  []Conn's Premier Light 64 Calories   [] Bud Select - 55 Calories   [] [place] Sisters Bloody Ashley Mix   [] Beebe Genuine Draft - 64 Calories   [] Red or White Wine - All Varieties     CEREALS: HOT/COLD   [] Angie's Randallin's Original Cereal  [] Rishi's Mill Oat Bran Hot Cereal - Cracked Wheat, Multi-Grain  [] Kashi GoLean Cereal  [] Kashi GoLean Hot Cereal packets - Vanilla; Honey  Cinnamon  [] Aleida's Special K Protein Cereal  [] Doretha's Steel Cut Gerda Oatmeal  [] Nature's Path Smart Bran  [] Rastafari Instant Oatmeal packet, Original  [] Rastafari Old Fashioned Rastafari Oats  [] Uncle Brody's Whole Wheat & Flaxseed Original Cereal

## 2022-09-16 NOTE — PROGRESS NOTES
"The patient location is: [facility]   The chief complaint leading to consultation is: healthy lifestyle     Visit type: audiovisual    Face to Face time with patient: 60 min   60 minutes of total time spent on the encounter, which includes face to face time and non-face to face time preparing to see the patient (eg, review of tests), Obtaining and/or reviewing separately obtained history, Documenting clinical information in the electronic or other health record, Independently interpreting results (not separately reported) and communicating results to the patient/family/caregiver, or Care coordination (not separately reported).       Each patient to whom he or she provides medical services by telemedicine is:  (1) informed of the relationship between the physician and patient and the respective role of any other health care provider with respect to management of the patient; and (2) notified that he or she may decline to receive medical services by telemedicine and may withdraw from such care at any time.    Nutrition Assessment for Initial Medical Nutrition Therapy + Employee Wellness       Reason for MNT visit: Pt in for education and nutrition counseling regarding meal planning and healthy food choices.       ASSESSMENT    Age: 32 y.o.  Wt: 279#   Goal Wt: 200#   Wt Readings from Last 1 Encounters:   07/18/22 126.6 kg (279 lb)     Ht: 5'6"  Ht Readings from Last 1 Encounters:   07/18/22 5' 6" (1.676 m)     BMI: 45  BMI Readings from Last 1 Encounters:   07/18/22 45.03 kg/m²       Goals/Outcomes:   What would you like to accomplish in regards to your health? Patient states she is looking to find a nutrition plan that fits her lifestyle to be healthier, not focused on losing wt quickly, just wants to have more healthy options that are quick and easy. She is not going to do a lot of cooking and prepping, but is open to making some changes to cut back on dinning out so much.   Pt is not concerned " about losing weight at this time but ideally would like to get down to 200#s - 5 years ago was around 200#s before first pregnancy then never got it off  - feels like this is realistic. Wants to lose the weight slowly and naturally by lifestyle changes, no crash dieting   Pt states she is an all or nothing type person when it come to diet and food      Nutrition Concerns: hashimoto's, eczema, wt gain      Clinical signs/symptoms: none     Lifestyle Influences  Support System:  with 3 young kids      Medical History:   Past Medical History:   Diagnosis Date    Migraines     Thyroid disease      Problem List             Resolved    Strep throat            Medications:   Current Outpatient Medications:     levothyroxine (SYNTHROID) 200 MCG tablet, Take 200 mcg by mouth., Disp: , Rfl:     levothyroxine (SYNTHROID) 25 MCG tablet, Take 25 mcg by mouth once daily., Disp: , Rfl:     propranoloL (INDERAL LA) 60 MG 24 hr capsule, Take 60 mg by mouth once daily., Disp: , Rfl:     UBRELVY 100 mg tablet, Take by mouth., Disp: , Rfl:     Supplements: multi - not consistent   Tried probiotic off and on  - will provide suggested options  - recommended starting one and taking daily     Past diets/eating patterns: thought about keto, but nervous to start something so extreme      Food allergies  intolerances: did a food allergy test and did not have any sever reactions, but found sensitivity to gluten, chickpeas, chicken, grape leaf, yen, garlic, egg whites, wheat, pinenut, tomatoes, and other seasonings    Noticed eliminating gluten did not help eczema     Labs:  Reviewed   No results found for: HGBA1C  No results found for: CHOL, TRIG, HDL, LDLCALC, CHOLHDL, NONHDLCHOL  No results found for: [id number], [id number], [id number]  No results found for: TSH    Sleep Hygiene: sleep is not an issue, gets an average of 7 hours      Stress Level: moderate, mostly coming from work      Current Activity Level: not active - use to  exercise before kids  - hard to find the time with 3 kids     Dining out: Regular, 4-6 times per week - grocery store/deli/cafe around the hospital     Beverages: 8-16 oz Coffee + Starbucks creamer, tea, lemonade, 1-2 cans regular soda/coke (this is her vice), some water (needs to increase, may get 16-32oz/day, some days none)     Alcohol: nonsmoker, no alcohol  - gets migraines from alcohol so avoids it, but may have a drink every 3-6 months     Meal preparation/shopping: self, spouse    Typical food recall: Reviewed and noted during consultation. Started Hello [employer] recently, was picking up fast food and ordering out from restaurants every night for self and . Pt still dines out for lunch daily, does not meal prep. Looking at low calorie, low carb meals stresses her out so avoids it.    Needs easy and quick snacks and meals     Wakes:  6am   BKF: skips on most days  - may have coffee and granola bar, breakfast burritos, cereal, grits, hidalgo    Lunch: 11:00/11:30  - chicken Cesear wrap, veggie sushi, sandwiches, salsa and chips, taco bell   Dinner: 6:00/6:30pm - Rotisserie chicken, some veggies, hello fresh meals with chicken, beef, pork,   Snacks:  not a big snacker, but if someone brings something to work will have it after lunch   Bed: 10pm     Food Dislikes: seafood     Patient motivation, anticipated barriers, expected compliance: Patient is motivated and has verbalized understanding and intent to comply    DIAGNOSIS   Problem: Obesity   Etiology: RT excessive energy intake from fast food   Signs/Symptoms: AEB diet recall and BMI       INTERVENTION  Nutrition prescription: Estimated energy requirements:   Calories: 1,800 - 1,900   Protein: 140 - 150 grams   Carbohydrates: 160 - 170 grams   Total Fat: 50 - 60 grams   Focus on plant-based, heart healthy fats  Baseline for fluids: 130 - 140 oz + sweat loss     Recommendations & Goals:  Patient goals and recommendations are tailored to the specific  patient's needs, readiness to change, lifestyle, culture, skills, resources, & abilities. Strategies to help achieve these nutrition-related goals were discussed which can include but are not limited to SMART goal setting & mindful eating.     Aim for a minimum of 7 hours sleep   Exercise 60 minutes most days  Eat breakfast within 1-2 hours of waking up  Try not to skip any meals or snacks, not going more than 3-4 hours without eating.   At each meal and snack, try to include a source of fiber + lean protein + healthy fat.     Written Materials Provided  These resources are intended to assist the patient in making it easier to choose recommended options when eating out & to identify better-for-you brands at the grocery store:    Meal Planning Guide with recommendations discussed along with portion sizes and a customized meal plan   Eat Fit miguelito card as a reminder to download the miguelito to ones smart phone which provides: current Eat Fit partners, approved menu options, food labels for carb counting, & recipes   On-the-Go Food Guide  Brand Specific Eat Fit Grocery Product list   RD contact information- for patient to contact regarding any questions, needs, and/or concerns that may arise     MONITORING & EVALUATION    Communicated with healthcare provider    Documented plan for referral to appropriate agency/healthcare provider as needed    Comprehension: good     Motivation to change: moderate    Follow-up: PRN     Counseling time: 1 Hour

## 2023-01-13 ENCOUNTER — TELEPHONE (OUTPATIENT)
Dept: SURGERY | Facility: CLINIC | Age: 33
End: 2023-01-13
Payer: COMMERCIAL

## 2023-01-23 ENCOUNTER — TELEPHONE (OUTPATIENT)
Dept: SURGERY | Facility: CLINIC | Age: 33
End: 2023-01-23
Payer: COMMERCIAL

## 2023-02-06 ENCOUNTER — OFFICE VISIT (OUTPATIENT)
Dept: URGENT CARE | Facility: CLINIC | Age: 33
End: 2023-02-06
Payer: COMMERCIAL

## 2023-02-06 VITALS
OXYGEN SATURATION: 98 % | WEIGHT: 279 LBS | HEIGHT: 66 IN | HEART RATE: 97 BPM | TEMPERATURE: 98 F | BODY MASS INDEX: 44.84 KG/M2 | DIASTOLIC BLOOD PRESSURE: 103 MMHG | SYSTOLIC BLOOD PRESSURE: 150 MMHG

## 2023-02-06 DIAGNOSIS — R82.998 LEUKOCYTES IN URINE: ICD-10-CM

## 2023-02-06 DIAGNOSIS — N20.1 LEFT URETERAL STONE: Primary | ICD-10-CM

## 2023-02-06 LAB
BILIRUB UR QL STRIP: NEGATIVE
GLUCOSE UR QL STRIP: NEGATIVE
KETONES UR QL STRIP: NEGATIVE
LEUKOCYTE ESTERASE UR QL STRIP: POSITIVE
PH, POC UA: 5
POC BLOOD, URINE: NEGATIVE
POC NITRATES, URINE: NEGATIVE
PROT UR QL STRIP: NEGATIVE
SP GR UR STRIP: 1.02 (ref 1–1.03)
UROBILINOGEN UR STRIP-ACNC: ABNORMAL (ref 0.1–1.1)

## 2023-02-06 PROCEDURE — 81003 URINALYSIS AUTO W/O SCOPE: CPT | Mod: QW,,, | Performed by: FAMILY MEDICINE

## 2023-02-06 PROCEDURE — 99213 PR OFFICE/OUTPT VISIT, EST, LEVL III, 20-29 MIN: ICD-10-PCS | Mod: 25,,, | Performed by: FAMILY MEDICINE

## 2023-02-06 PROCEDURE — 1160F RVW MEDS BY RX/DR IN RCRD: CPT | Mod: CPTII,,, | Performed by: FAMILY MEDICINE

## 2023-02-06 PROCEDURE — 3008F PR BODY MASS INDEX (BMI) DOCUMENTED: ICD-10-PCS | Mod: CPTII,,, | Performed by: FAMILY MEDICINE

## 2023-02-06 PROCEDURE — 1160F PR REVIEW ALL MEDS BY PRESCRIBER/CLIN PHARMACIST DOCUMENTED: ICD-10-PCS | Mod: CPTII,,, | Performed by: FAMILY MEDICINE

## 2023-02-06 PROCEDURE — 99213 OFFICE O/P EST LOW 20 MIN: CPT | Mod: 25,,, | Performed by: FAMILY MEDICINE

## 2023-02-06 PROCEDURE — 3008F BODY MASS INDEX DOCD: CPT | Mod: CPTII,,, | Performed by: FAMILY MEDICINE

## 2023-02-06 PROCEDURE — 1159F PR MEDICATION LIST DOCUMENTED IN MEDICAL RECORD: ICD-10-PCS | Mod: CPTII,,, | Performed by: FAMILY MEDICINE

## 2023-02-06 PROCEDURE — 3077F PR MOST RECENT SYSTOLIC BLOOD PRESSURE >= 140 MM HG: ICD-10-PCS | Mod: CPTII,,, | Performed by: FAMILY MEDICINE

## 2023-02-06 PROCEDURE — 96372 THER/PROPH/DIAG INJ SC/IM: CPT | Mod: ,,, | Performed by: FAMILY MEDICINE

## 2023-02-06 PROCEDURE — 1159F MED LIST DOCD IN RCRD: CPT | Mod: CPTII,,, | Performed by: FAMILY MEDICINE

## 2023-02-06 PROCEDURE — 3080F DIAST BP >= 90 MM HG: CPT | Mod: CPTII,,, | Performed by: FAMILY MEDICINE

## 2023-02-06 PROCEDURE — 81003 POCT URINALYSIS, DIPSTICK, AUTOMATED, W/O SCOPE: ICD-10-PCS | Mod: QW,,, | Performed by: FAMILY MEDICINE

## 2023-02-06 PROCEDURE — 96372 PR INJECTION,THERAP/PROPH/DIAG2ST, IM OR SUBCUT: ICD-10-PCS | Mod: ,,, | Performed by: FAMILY MEDICINE

## 2023-02-06 PROCEDURE — 3077F SYST BP >= 140 MM HG: CPT | Mod: CPTII,,, | Performed by: FAMILY MEDICINE

## 2023-02-06 PROCEDURE — 3080F PR MOST RECENT DIASTOLIC BLOOD PRESSURE >= 90 MM HG: ICD-10-PCS | Mod: CPTII,,, | Performed by: FAMILY MEDICINE

## 2023-02-06 RX ORDER — KETOROLAC TROMETHAMINE 10 MG/1
10 TABLET, FILM COATED ORAL EVERY 6 HOURS
Qty: 20 TABLET | Refills: 0 | Status: SHIPPED | OUTPATIENT
Start: 2023-02-06 | End: 2023-02-11

## 2023-02-06 RX ORDER — TAMSULOSIN HYDROCHLORIDE 0.4 MG/1
0.4 CAPSULE ORAL DAILY
Qty: 15 CAPSULE | Refills: 0 | Status: SHIPPED | OUTPATIENT
Start: 2023-02-06 | End: 2023-03-14

## 2023-02-06 RX ORDER — HYDROCODONE BITARTRATE AND ACETAMINOPHEN 5; 325 MG/1; MG/1
1 TABLET ORAL EVERY 6 HOURS PRN
Qty: 10 TABLET | Refills: 0 | Status: SHIPPED | OUTPATIENT
Start: 2023-02-06 | End: 2023-03-14

## 2023-02-06 RX ORDER — KETOROLAC TROMETHAMINE 30 MG/ML
30 INJECTION, SOLUTION INTRAMUSCULAR; INTRAVENOUS
Status: COMPLETED | OUTPATIENT
Start: 2023-02-06 | End: 2023-02-06

## 2023-02-06 RX ADMIN — KETOROLAC TROMETHAMINE 30 MG: 30 INJECTION, SOLUTION INTRAMUSCULAR; INTRAVENOUS at 09:02

## 2023-02-06 NOTE — PROGRESS NOTES
Patient ID: [id number]     Chief Complaint:  Back pain    History of Present Illness:     Sonia Shelley is a 32 y.o. female  who presents today for symptoms of Back Pain (32 y.o. female presents to the clinic with lower back pain ( denies trauma or heavy lifting) ,dark urine, sore throat,fever 100.6, congestion, cough, nausea for since last night. Pain level is a consistent 6.  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  The pain is colicky and aching.  Character and intensity does not change with alteration of body position.  Reports of this feels like the pain when giving birth.  She notes that but females in her family have history of kidney stone comes, but she does not per lung.  No bowel or bladder dysfunction, no saddle anesthesia, motor deficits, no sensory deficits.      Pt denies experiencing any fevers, chills,  vomiting, difficulty breathing, dysphagia, or neck stiffness.    Past Medical History:     ----------------------------  LYDIA (generalized anxiety disorder)  Migraines  Thyroid disease     Past Surgical History:   Procedure Laterality Date    LYMPH NODE BIOPSY      TONSILLECTOMY         Review of patient's allergies indicates:   Allergen Reactions    Penicillins Anaphylaxis    Amoxicillin Hives    Cephalosporins        Outpatient Medications Marked as Taking for the 2/6/23 encounter (Office Visit) with Christophe Polanco MD   Medication Sig Dispense Refill    chlorhexidine (PERIDEX) 0.12 % solution 2 (two) times daily.      levothyroxine (SYNTHROID) 200 MCG tablet Take 1 tablet (200 mcg total) by mouth before breakfast. 30 tablet 1    levothyroxine (SYNTHROID) 25 MCG tablet Take 25 mcg by mouth once daily.      PREVIDENT 5000 BOOSTER PLUS 1.1 % Pste SMARTSIG:To Teeth As Directed      propranoloL (INDERAL LA) 60 MG 24 hr capsule  Take 1 capsule (60 mg total) by mouth once daily. 30 capsule 0    UBRELVY 100 mg tablet Take by mouth.       Current Facility-Administered Medications for the 2/6/23 encounter (Office Visit) with Christophe Polanco MD   Medication Dose Route Frequency Provider Last Rate Last Admin    ketorolac injection 30 mg  30 mg Intramuscular 1 time in Clinic/HOD Christophe Polanco MD           Social History     Socioeconomic History    Marital status:    Occupational History     Employer: [employer]   Tobacco Use    Smoking status: Never    Smokeless tobacco: Never   Substance and Sexual Activity    Alcohol use: Not Currently     Comment: May drink one drink a month    Drug use: Never    Sexual activity: Yes     Partners: Male     Birth control/protection: Abstinence, Rhythm        Family History   Problem Relation Age of Onset    Breast cancer Mother 40    Cancer Mother         Breast    Depression Mother     Hyperlipidemia Mother     Macular degeneration Maternal Grandmother     Depression Maternal Grandmother     Kidney disease Maternal Grandmother     Vision loss Maternal Grandmother     Emphysema Maternal Grandfather     Prostate cancer Maternal Grandfather 50    Skin cancer Maternal Grandfather     Cancer Maternal Grandfather         Prostate and Skin    COPD Maternal Grandfather     Heart disease Maternal Grandfather     Emphysema Paternal Grandmother     COPD Paternal Grandmother     Diabetes Paternal Grandmother         Type 2    Skin cancer Maternal Aunt     Brain cancer Other     Leukemia Paternal Uncle 17    Alcohol abuse Father     COPD Paternal Grandfather     Cancer Paternal Uncle     Depression Maternal Aunt     Kidney disease Maternal Aunt         Subjective:     Review of Systems   Constitutional:  Negative for chills, fever and malaise/fatigue.   Musculoskeletal:  Positive for back pain. Negative for falls.   Neurological:  Negative for dizziness, tingling, sensory change, speech change, focal weakness, seizures  "and loss of consciousness.     Objective:     BP (!) 150/103   Pulse 97   Temp 98 °F (36.7 °C)   Ht 5' 6" (1.676 m)   Wt 126.6 kg (279 lb)   LMP 02/01/2023 (Exact Date)   SpO2 98%   BMI 45.03 kg/m²     Physical Exam  Constitutional:       General: She is not in acute distress.     Appearance: Normal appearance. She is not ill-appearing or toxic-appearing.   HENT:      Head: Normocephalic and atraumatic.   Cardiovascular:      Rate and Rhythm: Normal rate and regular rhythm.   Pulmonary:      Effort: Pulmonary effort is normal.      Breath sounds: Normal breath sounds.   Abdominal:      Tenderness: There is left CVA tenderness.   Musculoskeletal:         General: Tenderness present. No swelling, deformity or signs of injury.      Cervical back: No rigidity or tenderness.      Right lower leg: No edema.      Left lower leg: No edema.      Comments: Mild Tenderness of paraspinal muscles at about L1.  No bony spinal tenderness midline or otherwise.  Left Straight leg does aggravate the pain.   Lymphadenopathy:      Cervical: No cervical adenopathy.   Neurological:      General: No focal deficit present.      Mental Status: She is alert and oriented to person, place, and time.      Cranial Nerves: No cranial nerve deficit.      Sensory: No sensory deficit.      Motor: No weakness.      Coordination: Coordination normal.      Gait: Gait normal.      Deep Tendon Reflexes: Reflexes normal.   Psychiatric:         Mood and Affect: Mood normal.         Behavior: Behavior normal.         Thought Content: Thought content normal.       Assessment & Plan:       ICD-10-CM ICD-9-CM   1. Left ureteral stone  N20.1 592.1        1. Left ureteral stone  -     POCT Urinalysis, Dipstick, Automated, W/O Scope  -     ketorolac injection 30 mg  -     ketorolac (TORADOL) 10 mg tablet; Take 1 tablet (10 mg total) by mouth every 6 (six) hours. for 5 days  Dispense: 20 tablet; Refill: 0  -     HYDROcodone-acetaminophen (NORCO) 5-325 mg " per tablet; Take 1 tablet by mouth every 6 (six) hours as needed for Pain.  Dispense: 10 tablet; Refill: 0  -     tamsulosin (FLOMAX) 0.4 mg Cap; Take 1 capsule (0.4 mg total) by mouth once daily. for 15 days  Dispense: 15 capsule; Refill: 0       Discussed the uncertain etiology of the left flank pain, but most likely point to a left ureteral stone.  Dipstick was negative for and, positive for leukocyte esterase and dehydration.  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  Will cover her with ketorolac and Norco in the event that she cannot get in to see her PCP for a few days.

## 2023-02-06 NOTE — PATIENT INSTRUCTIONS
Please take ketorolac tablets as needed every 6 hours beginning at least 12 hours after your Toradol injection.    Please follow-up with your primary care physician, or the emergency room, to obtain a CT scan to confirm or rule out a kidney stone.    Your urine dipstick showed that your dehydrated, so please drink plenty of fluids.

## 2023-02-15 PROBLEM — J02.0 STREP THROAT: Status: RESOLVED | Noted: 2022-07-15 | Resolved: 2023-02-15

## 2023-02-15 PROBLEM — E03.9 HYPOTHYROIDISM: Status: ACTIVE | Noted: 2023-02-15

## 2023-02-16 ENCOUNTER — LAB VISIT (OUTPATIENT)
Dept: LAB | Facility: HOSPITAL | Age: 33
End: 2023-02-16
Attending: FAMILY MEDICINE
Payer: COMMERCIAL

## 2023-02-16 DIAGNOSIS — E03.9 HYPOTHYROIDISM, UNSPECIFIED TYPE: ICD-10-CM

## 2023-02-16 DIAGNOSIS — E66.01 CLASS 3 SEVERE OBESITY WITH BODY MASS INDEX (BMI) OF 40.0 TO 44.9 IN ADULT, UNSPECIFIED OBESITY TYPE, UNSPECIFIED WHETHER SERIOUS COMORBIDITY PRESENT: ICD-10-CM

## 2023-02-16 DIAGNOSIS — R10.9 LEFT FLANK PAIN: ICD-10-CM

## 2023-02-16 LAB
ANION GAP SERPL CALC-SCNC: 5 MEQ/L
APPEARANCE UR: CLEAR
BACTERIA #/AREA URNS AUTO: NORMAL /HPF
BILIRUB UR QL STRIP.AUTO: NEGATIVE MG/DL
BUN SERPL-MCNC: 12.9 MG/DL (ref 7–18.7)
CALCIUM SERPL-MCNC: 9.4 MG/DL (ref 8.4–10.2)
CHLORIDE SERPL-SCNC: 108 MMOL/L (ref 98–107)
CO2 SERPL-SCNC: 28 MMOL/L (ref 22–29)
COLOR UR AUTO: YELLOW
CREAT SERPL-MCNC: 0.74 MG/DL (ref 0.55–1.02)
CREAT/UREA NIT SERPL: 17
EST. AVERAGE GLUCOSE BLD GHB EST-MCNC: 99.7 MG/DL
GFR SERPLBLD CREATININE-BSD FMLA CKD-EPI: >60 MLS/MIN/1.73/M2
GLUCOSE SERPL-MCNC: 96 MG/DL (ref 74–100)
GLUCOSE UR QL STRIP.AUTO: NEGATIVE MG/DL
HBA1C MFR BLD: 5.1 %
KETONES UR QL STRIP.AUTO: NEGATIVE MG/DL
LEUKOCYTE ESTERASE UR QL STRIP.AUTO: ABNORMAL UNIT/L
NITRITE UR QL STRIP.AUTO: NEGATIVE
PH UR STRIP.AUTO: 6 [PH]
POTASSIUM SERPL-SCNC: 4.6 MMOL/L (ref 3.5–5.1)
PROT UR QL STRIP.AUTO: NEGATIVE MG/DL
RBC #/AREA URNS AUTO: <5 /HPF
RBC UR QL AUTO: NEGATIVE UNIT/L
SODIUM SERPL-SCNC: 141 MMOL/L (ref 136–145)
SP GR UR STRIP.AUTO: 1.02 (ref 1–1.03)
SQUAMOUS #/AREA URNS AUTO: <5 /HPF
TSH SERPL-ACNC: 0.05 UIU/ML (ref 0.35–4.94)
UROBILINOGEN UR STRIP-ACNC: 0.2 MG/DL
WBC #/AREA URNS AUTO: 5 /HPF

## 2023-02-16 PROCEDURE — 80048 BASIC METABOLIC PNL TOTAL CA: CPT

## 2023-02-16 PROCEDURE — 83036 HEMOGLOBIN GLYCOSYLATED A1C: CPT

## 2023-02-16 PROCEDURE — 36415 COLL VENOUS BLD VENIPUNCTURE: CPT

## 2023-02-16 PROCEDURE — 87088 URINE BACTERIA CULTURE: CPT

## 2023-02-16 PROCEDURE — 84443 ASSAY THYROID STIM HORMONE: CPT

## 2023-02-16 PROCEDURE — 81001 URINALYSIS AUTO W/SCOPE: CPT

## 2023-02-18 LAB — BACTERIA UR CULT: NORMAL

## 2023-02-27 ENCOUNTER — OFFICE VISIT (OUTPATIENT)
Dept: URGENT CARE | Facility: CLINIC | Age: 33
End: 2023-02-27
Payer: COMMERCIAL

## 2023-02-27 VITALS
HEIGHT: 66 IN | RESPIRATION RATE: 18 BRPM | TEMPERATURE: 99 F | HEART RATE: 76 BPM | SYSTOLIC BLOOD PRESSURE: 108 MMHG | BODY MASS INDEX: 44.2 KG/M2 | OXYGEN SATURATION: 96 % | DIASTOLIC BLOOD PRESSURE: 81 MMHG | WEIGHT: 275 LBS

## 2023-02-27 DIAGNOSIS — J01.90 ACUTE NON-RECURRENT SINUSITIS, UNSPECIFIED LOCATION: ICD-10-CM

## 2023-02-27 DIAGNOSIS — H66.001 ACUTE SUPPURATIVE OTITIS MEDIA OF RIGHT EAR WITHOUT SPONTANEOUS RUPTURE OF TYMPANIC MEMBRANE, RECURRENCE NOT SPECIFIED: Primary | ICD-10-CM

## 2023-02-27 DIAGNOSIS — J06.9 VIRAL URI WITH COUGH: ICD-10-CM

## 2023-02-27 LAB
CTP QC/QA: YES
MOLECULAR STREP A: NEGATIVE
POC MOLECULAR INFLUENZA A AGN: NEGATIVE
POC MOLECULAR INFLUENZA B AGN: NEGATIVE
SARS-COV-2 RDRP RESP QL NAA+PROBE: NEGATIVE

## 2023-02-27 PROCEDURE — 87502 INFLUENZA DNA AMP PROBE: CPT | Mod: QW,,,

## 2023-02-27 PROCEDURE — 3074F SYST BP LT 130 MM HG: CPT | Mod: CPTII,,,

## 2023-02-27 PROCEDURE — 3079F PR MOST RECENT DIASTOLIC BLOOD PRESSURE 80-89 MM HG: ICD-10-PCS | Mod: CPTII,,,

## 2023-02-27 PROCEDURE — 99214 OFFICE O/P EST MOD 30 MIN: CPT | Mod: 25,,,

## 2023-02-27 PROCEDURE — 1160F RVW MEDS BY RX/DR IN RCRD: CPT | Mod: CPTII,,,

## 2023-02-27 PROCEDURE — 87635 SARS-COV-2 COVID-19 AMP PRB: CPT | Mod: QW,,,

## 2023-02-27 PROCEDURE — 1159F PR MEDICATION LIST DOCUMENTED IN MEDICAL RECORD: ICD-10-PCS | Mod: CPTII,,,

## 2023-02-27 PROCEDURE — 96372 PR INJECTION,THERAP/PROPH/DIAG2ST, IM OR SUBCUT: ICD-10-PCS | Mod: ,,,

## 2023-02-27 PROCEDURE — 1159F MED LIST DOCD IN RCRD: CPT | Mod: CPTII,,,

## 2023-02-27 PROCEDURE — 3074F PR MOST RECENT SYSTOLIC BLOOD PRESSURE < 130 MM HG: ICD-10-PCS | Mod: CPTII,,,

## 2023-02-27 PROCEDURE — 3079F DIAST BP 80-89 MM HG: CPT | Mod: CPTII,,,

## 2023-02-27 PROCEDURE — 87651 POCT STREP A MOLECULAR: ICD-10-PCS | Mod: QW,,,

## 2023-02-27 PROCEDURE — 3008F PR BODY MASS INDEX (BMI) DOCUMENTED: ICD-10-PCS | Mod: CPTII,,,

## 2023-02-27 PROCEDURE — 3008F BODY MASS INDEX DOCD: CPT | Mod: CPTII,,,

## 2023-02-27 PROCEDURE — 87651 STREP A DNA AMP PROBE: CPT | Mod: QW,,,

## 2023-02-27 PROCEDURE — 87635: ICD-10-PCS | Mod: QW,,,

## 2023-02-27 PROCEDURE — 87502 POCT INFLUENZA A/B MOLECULAR: ICD-10-PCS | Mod: QW,,,

## 2023-02-27 PROCEDURE — 99214 PR OFFICE/OUTPT VISIT, EST, LEVL IV, 30-39 MIN: ICD-10-PCS | Mod: 25,,,

## 2023-02-27 PROCEDURE — 96372 THER/PROPH/DIAG INJ SC/IM: CPT | Mod: ,,,

## 2023-02-27 PROCEDURE — 1160F PR REVIEW ALL MEDS BY PRESCRIBER/CLIN PHARMACIST DOCUMENTED: ICD-10-PCS | Mod: CPTII,,,

## 2023-02-27 RX ORDER — BETAMETHASONE SODIUM PHOSPHATE AND BETAMETHASONE ACETATE 3; 3 MG/ML; MG/ML
9 INJECTION, SUSPENSION INTRA-ARTICULAR; INTRALESIONAL; INTRAMUSCULAR; SOFT TISSUE
Status: COMPLETED | OUTPATIENT
Start: 2023-02-27 | End: 2023-02-27

## 2023-02-27 RX ORDER — DOXYCYCLINE 100 MG/1
100 CAPSULE ORAL EVERY 12 HOURS
Qty: 10 CAPSULE | Refills: 0 | Status: SHIPPED | OUTPATIENT
Start: 2023-02-27 | End: 2023-03-04

## 2023-02-27 RX ORDER — PROMETHAZINE HYDROCHLORIDE AND DEXTROMETHORPHAN HYDROBROMIDE 6.25; 15 MG/5ML; MG/5ML
5 SYRUP ORAL EVERY 6 HOURS PRN
Qty: 118 ML | Refills: 0 | Status: SHIPPED | OUTPATIENT
Start: 2023-02-27 | End: 2023-03-09

## 2023-02-27 RX ADMIN — BETAMETHASONE SODIUM PHOSPHATE AND BETAMETHASONE ACETATE 9 MG: 3; 3 INJECTION, SUSPENSION INTRA-ARTICULAR; INTRALESIONAL; INTRAMUSCULAR; SOFT TISSUE at 09:02

## 2023-02-27 NOTE — PROGRESS NOTES
"Subjective:       Patient ID: Sonia Shelley is a 32 y.o. female.    Vitals:  height is 5' 6" (1.676 m) and weight is 124.7 kg (275 lb). Her oral temperature is 98.8 °F (37.1 °C). Her blood pressure is 108/81 and her pulse is 76. Her respiration is 18 and oxygen saturation is 96%.     Chief Complaint: Sore Throat (Sore throat, bilateral ear pain, nasal congestion x 3-4 days )    Patient is a 32-year-old female who presents to clinic with her son for complaints of sore throat, bilateral otalgia, nasal congestion, sinus pressure over the last 3-4 days.  Patient reports son began with symptoms 1 week ago.  Other children are also sick with viral upper respiratory symptoms.  She denies known fever however she does complain of chills last night.  Denies neck stiffness, rash, shortness breath, GI symptoms.    ROS    Objective:      Physical Exam   Constitutional: She is oriented to person, place, and time. She appears well-developed. She is cooperative.  Non-toxic appearance. She does not appear ill. No distress.   HENT:   Head: Normocephalic and atraumatic.   Ears:   Right Ear: Hearing, external ear and ear canal normal.   Left Ear: Hearing, tympanic membrane, external ear and ear canal normal.      Comments: Right TM: Purulent effusion  Nose: Nose normal. No mucosal edema, rhinorrhea or nasal deformity. No epistaxis. Right sinus exhibits no maxillary sinus tenderness and no frontal sinus tenderness. Left sinus exhibits no maxillary sinus tenderness and no frontal sinus tenderness.   Mouth/Throat: Uvula is midline and mucous membranes are normal. Mucous membranes are moist. No trismus in the jaw. Normal dentition. No uvula swelling. Oropharyngeal exudate and posterior oropharyngeal erythema present. No posterior oropharyngeal edema.   Eyes: Conjunctivae and lids are normal. No scleral icterus.   Neck: Trachea normal and phonation normal. Neck supple. No edema present. No erythema present. No neck rigidity present. "   Cardiovascular: Normal rate, regular rhythm, normal heart sounds and normal pulses.   Pulmonary/Chest: Effort normal and breath sounds normal. No respiratory distress. She has no decreased breath sounds. She has no wheezes. She has no rhonchi.   Abdominal: Normal appearance.   Musculoskeletal: Normal range of motion.         General: No deformity. Normal range of motion.   Lymphadenopathy:     She has cervical adenopathy.   Neurological: She is alert and oriented to person, place, and time. She exhibits normal muscle tone. Coordination normal.   Skin: Skin is warm, dry, intact, not diaphoretic and not pale.   Psychiatric: Her speech is normal and behavior is normal. Judgment and thought content normal.   Nursing note and vitals reviewed.      Assessment:       1. Acute suppurative otitis media of right ear without spontaneous rupture of tympanic membrane, recurrence not specified    2. Viral URI with cough    3. Acute non-recurrent sinusitis, unspecified location          Plan:         Acute suppurative otitis media of right ear without spontaneous rupture of tympanic membrane, recurrence not specified  -     doxycycline (MONODOX) 100 MG capsule; Take 1 capsule (100 mg total) by mouth every 12 (twelve) hours. for 5 days  Dispense: 10 capsule; Refill: 0  -     betamethasone acetate-betamethasone sodium phosphate injection 9 mg    Viral URI with cough  -     POCT COVID-19 Rapid Screening  -     POCT Influenza A/B MOLECULAR  -     POCT Strep A, Molecular  -     promethazine-dextromethorphan (PROMETHAZINE-DM) 6.25-15 mg/5 mL Syrp; Take 5 mLs by mouth every 6 (six) hours as needed (cough).  Dispense: 118 mL; Refill: 0  -     betamethasone acetate-betamethasone sodium phosphate injection 9 mg    Acute non-recurrent sinusitis, unspecified location               Drink plenty of fluids.     Get plenty of rest.     Tylenol or Motrin as needed.       Go to the ER with any significant change or worsening of symptoms.      Follow up with your primary care doctor.

## 2023-02-27 NOTE — PATIENT INSTRUCTIONS
Negative flu, COVID, strep     Drink plenty of fluids.     Get plenty of rest.     Tylenol or Motrin as needed.   Prescription cough syrup may make you drowsy.  Take oral antibiotics until complete.    Go to the ER with any significant change or worsening of symptoms.     Follow up with your primary care doctor.

## 2023-06-29 ENCOUNTER — OFFICE VISIT (OUTPATIENT)
Dept: URGENT CARE | Facility: CLINIC | Age: 33
End: 2023-06-29
Payer: COMMERCIAL

## 2023-06-29 VITALS
TEMPERATURE: 98 F | WEIGHT: 265 LBS | DIASTOLIC BLOOD PRESSURE: 76 MMHG | HEART RATE: 75 BPM | RESPIRATION RATE: 16 BRPM | HEIGHT: 65 IN | BODY MASS INDEX: 44.15 KG/M2 | SYSTOLIC BLOOD PRESSURE: 116 MMHG | OXYGEN SATURATION: 98 %

## 2023-06-29 DIAGNOSIS — J02.9 SORE THROAT: Primary | ICD-10-CM

## 2023-06-29 LAB
CTP QC/QA: YES
MOLECULAR STREP A: NEGATIVE

## 2023-06-29 PROCEDURE — 99213 PR OFFICE/OUTPT VISIT, EST, LEVL III, 20-29 MIN: ICD-10-PCS | Mod: 25,,, | Performed by: FAMILY MEDICINE

## 2023-06-29 PROCEDURE — 87651 STREP A DNA AMP PROBE: CPT | Mod: QW,,, | Performed by: FAMILY MEDICINE

## 2023-06-29 PROCEDURE — 87651 POCT STREP A MOLECULAR: ICD-10-PCS | Mod: QW,,, | Performed by: FAMILY MEDICINE

## 2023-06-29 PROCEDURE — 99213 OFFICE O/P EST LOW 20 MIN: CPT | Mod: 25,,, | Performed by: FAMILY MEDICINE

## 2023-06-29 PROCEDURE — 96372 PR INJECTION,THERAP/PROPH/DIAG2ST, IM OR SUBCUT: ICD-10-PCS | Mod: ,,, | Performed by: FAMILY MEDICINE

## 2023-06-29 PROCEDURE — 96372 THER/PROPH/DIAG INJ SC/IM: CPT | Mod: ,,, | Performed by: FAMILY MEDICINE

## 2023-06-29 RX ORDER — DEXAMETHASONE SODIUM PHOSPHATE 100 MG/10ML
10 INJECTION INTRAMUSCULAR; INTRAVENOUS
Status: COMPLETED | OUTPATIENT
Start: 2023-06-29 | End: 2023-06-29

## 2023-06-29 RX ORDER — AZITHROMYCIN 250 MG/1
TABLET, FILM COATED ORAL
Qty: 6 TABLET | Refills: 0 | Status: SHIPPED | OUTPATIENT
Start: 2023-06-29 | End: 2023-07-04

## 2023-06-29 RX ADMIN — DEXAMETHASONE SODIUM PHOSPHATE 10 MG: 100 INJECTION INTRAMUSCULAR; INTRAVENOUS at 09:06

## 2023-06-29 NOTE — PROGRESS NOTES
"Subjective:      Patient ID: Sonia Shelley is a 32 y.o. female.    Vitals:  height is 5' 5" (1.651 m) and weight is 120.2 kg (265 lb). Her temperature is 98.1 °F (36.7 °C). Her blood pressure is 116/76 and her pulse is 75. Her respiration is 16 and oxygen saturation is 98%.     Chief Complaint: Sore Throat (Entered by patient)     Patient is a 32 y.o. female who presents to urgent care with complaints of sore throat, cough, fever, diarrhea x4 days.  T-max 101.2° Alleviating factors include Dayquil/Nyquil with mild amount of relief. Patient denies shortness of breath, chest pain.  Daughter and son tested positive for strep today.  Denies any shortness of breath or vomiting.      Sore Throat   Associated symptoms include coughing.   Constitution: Positive for fever.   HENT:  Positive for sore throat.    Cardiovascular: Negative.    Eyes: Negative.    Respiratory:  Positive for cough.    Gastrointestinal: Negative.    Genitourinary: Negative.    Musculoskeletal: Negative.    Skin: Negative.    Allergic/Immunologic: Negative.    Neurological: Negative.    Hematologic/Lymphatic: Negative.     Objective:     Physical Exam   Constitutional: She is oriented to person, place, and time. She appears well-developed. She is cooperative.  Non-toxic appearance. She does not appear ill. No distress.   HENT:   Head: Normocephalic and atraumatic.   Ears:   Right Ear: Hearing and external ear normal.   Left Ear: Hearing and external ear normal.   Mouth/Throat: Mucous membranes are normal. Posterior oropharyngeal erythema (postnasal drip) present.   Eyes: Conjunctivae and lids are normal.   Neck: Trachea normal and phonation normal. Neck supple. No edema present. No erythema present. No neck rigidity present.   Cardiovascular: Normal rate.   Pulmonary/Chest: Effort normal and breath sounds normal. No stridor. No respiratory distress. She has no decreased breath sounds. She has no wheezes. She has no rhonchi. She has no rales. "   Abdominal: Normal appearance.   Lymphadenopathy:     She has no cervical adenopathy.   Neurological: She is alert and oriented to person, place, and time. She exhibits normal muscle tone. Coordination normal.   Skin: Skin is warm, dry, intact, not diaphoretic and no rash.   Psychiatric: Her speech is normal and behavior is normal. Mood, judgment and thought content normal.   Nursing note and vitals reviewed.       Previous History      Review of patient's allergies indicates:   Allergen Reactions    Penicillins Hives and Swelling    Amoxicillin Hives    Cephalosporins        Past Medical History:   Diagnosis Date    LYDIA (generalized anxiety disorder)     Migraines     Thyroid disease      Current Outpatient Medications   Medication Instructions    azelastine-fluticasone (DYMISTA) 137-50 mcg/spray Spry nassal spray 1 spray, Each Nostril, 2 times daily    azithromycin (Z-PALMIRA) 250 MG tablet Take 2 tablets by mouth on day 1; Take 1 tablet by mouth on days 2-5<BR>    chlorhexidine (PERIDEX) 0.12 % solution 2 times daily    levothyroxine (SYNTHROID) 200 mcg, Oral, Before breakfast    meloxicam (MOBIC) 15 mg, Oral, Daily    PREVIDENT 5000 BOOSTER PLUS 1.1 % Pste SMARTSIG:To Teeth As Directed    propranoloL (INDERAL LA) 60 mg, Oral, Daily    UBRELVY 100 mg tablet TAKE 1 TABLET BY MOUTH UP TO TWICE DAILY AS NEEDED FOR MIGRAINE HEADACHE     Past Surgical History:   Procedure Laterality Date    LYMPH NODE BIOPSY      TONSILLECTOMY      WISDOM TOOTH EXTRACTION       Family History   Problem Relation Age of Onset    Breast cancer Mother 40    Cancer Mother         Breast    Depression Mother     Hyperlipidemia Mother     Macular degeneration Maternal Grandmother     Depression Maternal Grandmother     Kidney disease Maternal Grandmother     Vision loss Maternal Grandmother     Emphysema Maternal Grandfather     Prostate cancer Maternal Grandfather 50    Skin cancer Maternal Grandfather     Cancer Maternal Grandfather          "Prostate and Skin    COPD Maternal Grandfather     Heart disease Maternal Grandfather     Emphysema Paternal Grandmother     COPD Paternal Grandmother     Diabetes Paternal Grandmother         Type 2    Skin cancer Maternal Aunt     Brain cancer Other     Leukemia Paternal Uncle 17    Alcohol abuse Father     COPD Paternal Grandfather     Cancer Paternal Uncle     Depression Maternal Aunt     Kidney disease Maternal Aunt        Social History     Tobacco Use    Smoking status: Never    Smokeless tobacco: Never   Substance Use Topics    Alcohol use: Not Currently     Comment: May drink one drink a month    Drug use: Never        Physical Exam      Vital Signs Reviewed   /76   Pulse 75   Temp 98.1 °F (36.7 °C)   Resp 16   Ht 5' 5" (1.651 m)   Wt 120.2 kg (265 lb)   LMP 06/16/2023   SpO2 98%   BMI 44.10 kg/m²        Procedures    Procedures     Labs     Results for orders placed or performed in visit on 06/29/23   POCT Strep A, Molecular   Result Value Ref Range    Molecular Strep A, POC Negative Negative     Acceptable Yes        Assessment:     1. Sore throat        Plan:   Medications sent to pharmacy  Monitor for fever  Tylenol or ibuprofen as needed  Warm saltwater gargles  Do not share any food cups drinks or utensils with anybody.  Change your toothbrush after 2 days of antibiotics  Hydrate  Return to clinic or seek medical attention immediately if your symptoms persist or worsen      Sore throat  -     POCT Strep A, Molecular    Other orders  -     dexAMETHasone injection 10 mg  -     azithromycin (Z-PALMIRA) 250 MG tablet; Take 2 tablets by mouth on day 1; Take 1 tablet by mouth on days 2-5  Dispense: 6 tablet; Refill: 0                    "

## 2023-06-29 NOTE — PATIENT INSTRUCTIONS
Plan:   Medications sent to pharmacy  Monitor for fever  Tylenol or ibuprofen as needed  Warm saltwater gargles  Do not share any food cups drinks or utensils with anybody.  Change your toothbrush after 2 days of antibiotics  Hydrate  Return to clinic or seek medical attention immediately if your symptoms persist or worsen

## 2023-10-05 ENCOUNTER — OFFICE VISIT (OUTPATIENT)
Dept: URGENT CARE | Facility: CLINIC | Age: 33
End: 2023-10-05
Payer: COMMERCIAL

## 2023-10-05 VITALS
TEMPERATURE: 98 F | WEIGHT: 265 LBS | HEART RATE: 62 BPM | BODY MASS INDEX: 44.15 KG/M2 | SYSTOLIC BLOOD PRESSURE: 123 MMHG | HEIGHT: 65 IN | OXYGEN SATURATION: 100 % | RESPIRATION RATE: 17 BRPM | DIASTOLIC BLOOD PRESSURE: 85 MMHG

## 2023-10-05 DIAGNOSIS — J02.9 PHARYNGITIS, UNSPECIFIED ETIOLOGY: Primary | ICD-10-CM

## 2023-10-05 LAB
CTP QC/QA: YES
MOLECULAR STREP A: NEGATIVE

## 2023-10-05 PROCEDURE — 99213 OFFICE O/P EST LOW 20 MIN: CPT | Mod: ,,,

## 2023-10-05 PROCEDURE — 99213 PR OFFICE/OUTPT VISIT, EST, LEVL III, 20-29 MIN: ICD-10-PCS | Mod: ,,,

## 2023-10-05 PROCEDURE — 87651 STREP A DNA AMP PROBE: CPT | Mod: QW,,,

## 2023-10-05 PROCEDURE — 87651 POCT STREP A MOLECULAR: ICD-10-PCS | Mod: QW,,,

## 2023-10-05 NOTE — PROGRESS NOTES
"Subjective:      Patient ID: Sonia Shelley is a 33 y.o. female.    Vitals:  height is 5' 5" (1.651 m) and weight is 120.2 kg (265 lb). Her temperature is 98.1 °F (36.7 °C). Her blood pressure is 123/85 and her pulse is 62. Her respiration is 17 and oxygen saturation is 100%.     Chief Complaint: Sore Throat (X 2 days: Sore throat, headache - exposed to strep )    Patient is a 33-year-old female who presents to urgent care clinic with complaints of 2 day history of sore throat, patient reports onset headache this morning.  She does report recent strep exposure by her mother who watches her children.  Denies fever, body aches, chills, neck stiffness, rash, GI symptoms, cough, congestion.    Sore Throat         HENT:  Positive for sore throat.       Objective:     Physical Exam   Constitutional: She is oriented to person, place, and time. She appears well-developed. She is cooperative.  Non-toxic appearance. She does not appear ill. No distress.   HENT:   Head: Normocephalic and atraumatic.   Ears:   Right Ear: Hearing, tympanic membrane, external ear and ear canal normal.   Left Ear: Hearing, tympanic membrane, external ear and ear canal normal.   Nose: Nose normal. No mucosal edema, rhinorrhea or nasal deformity. No epistaxis. Right sinus exhibits no maxillary sinus tenderness and no frontal sinus tenderness. Left sinus exhibits no maxillary sinus tenderness and no frontal sinus tenderness.   Mouth/Throat: Uvula is midline, oropharynx is clear and moist and mucous membranes are normal. Mucous membranes are moist. No trismus in the jaw. Normal dentition. No uvula swelling. No oropharyngeal exudate or posterior oropharyngeal edema.      Comments: Mild erythema  Eyes: Conjunctivae and lids are normal. No scleral icterus.   Neck: Trachea normal and phonation normal. Neck supple. No edema present. No erythema present. No neck rigidity present.   Cardiovascular: Normal rate, regular rhythm, normal heart sounds and " normal pulses.   Pulmonary/Chest: Effort normal and breath sounds normal. No respiratory distress. She has no decreased breath sounds. She has no rhonchi.   Abdominal: Normal appearance.   Musculoskeletal: Normal range of motion.         General: No deformity. Normal range of motion.   Lymphadenopathy:     She has no cervical adenopathy.   Neurological: She is alert and oriented to person, place, and time. She exhibits normal muscle tone. Coordination normal.   Skin: Skin is warm, dry, intact, not diaphoretic and not pale.   Psychiatric: Her speech is normal and behavior is normal. Judgment and thought content normal.   Nursing note and vitals reviewed.      Assessment:     1. Pharyngitis, unspecified etiology        Plan:       Pharyngitis, unspecified etiology  -     POCT Strep A, Molecular           Patient declines steroid therapy in clinic.  Declines any other swabs.    Negative strep swab   Drink plenty of fluids. Get plenty of rest.   Claritin or Zyrtec 10 mg for nasal congestion.  Nasal spray such as Nasacort or Flonase for congestion.  Warm saltwater gargles for sore throat.  Warm water with honey to help coat the throat.  Throat lozenges.  Tylenol or ibuprofen as needed for sore throat and fever.   Chloraseptic Spray for worsening sore throat  Call or return to clinic as needed   Go to the ER with any significant change or worsening of symptoms.   Follow up with your primary care doctor.

## 2023-10-05 NOTE — PATIENT INSTRUCTIONS
Negative strep swab   Drink plenty of fluids. Get plenty of rest.   Claritin or Zyrtec 10 mg for nasal congestion.  Nasal spray such as Nasacort or Flonase for congestion.  Warm saltwater gargles for sore throat.  Warm water with honey to help coat the throat.  Throat lozenges.  Tylenol or ibuprofen as needed for sore throat and fever.   Chloraseptic Spray for worsening sore throat  Call or return to clinic as needed   Go to the ER with any significant change or worsening of symptoms.   Follow up with your primary care doctor.

## 2023-11-26 ENCOUNTER — OFFICE VISIT (OUTPATIENT)
Dept: URGENT CARE | Facility: CLINIC | Age: 33
End: 2023-11-26
Payer: COMMERCIAL

## 2023-11-26 VITALS
DIASTOLIC BLOOD PRESSURE: 78 MMHG | WEIGHT: 265 LBS | OXYGEN SATURATION: 98 % | TEMPERATURE: 99 F | BODY MASS INDEX: 44.15 KG/M2 | HEIGHT: 65 IN | SYSTOLIC BLOOD PRESSURE: 120 MMHG | RESPIRATION RATE: 18 BRPM | HEART RATE: 109 BPM

## 2023-11-26 DIAGNOSIS — J10.1 INFLUENZA A: Primary | ICD-10-CM

## 2023-11-26 LAB
CTP QC/QA: YES
MOLECULAR STREP A: NEGATIVE
POC MOLECULAR INFLUENZA A AGN: POSITIVE
POC MOLECULAR INFLUENZA B AGN: NEGATIVE
SARS-COV-2 RDRP RESP QL NAA+PROBE: NEGATIVE

## 2023-11-26 PROCEDURE — 87635: ICD-10-PCS | Mod: QW,,,

## 2023-11-26 PROCEDURE — 87635 SARS-COV-2 COVID-19 AMP PRB: CPT | Mod: QW,,,

## 2023-11-26 PROCEDURE — 87502 POCT INFLUENZA A/B MOLECULAR: ICD-10-PCS | Mod: QW,,,

## 2023-11-26 PROCEDURE — 87651 POCT STREP A MOLECULAR: ICD-10-PCS | Mod: QW,,,

## 2023-11-26 PROCEDURE — 87502 INFLUENZA DNA AMP PROBE: CPT | Mod: QW,,,

## 2023-11-26 PROCEDURE — 99214 OFFICE O/P EST MOD 30 MIN: CPT | Mod: ,,,

## 2023-11-26 PROCEDURE — 99214 PR OFFICE/OUTPT VISIT, EST, LEVL IV, 30-39 MIN: ICD-10-PCS | Mod: ,,,

## 2023-11-26 PROCEDURE — 87651 STREP A DNA AMP PROBE: CPT | Mod: QW,,,

## 2023-11-26 RX ORDER — DEXBROMPHENIRAMINE MALEATE, PHENYLEPHRINE HYDROCHLORIDE 2; 7.5 MG/1; MG/1
1 TABLET ORAL EVERY 4 HOURS PRN
Qty: 20 TABLET | Refills: 0 | Status: SHIPPED | OUTPATIENT
Start: 2023-11-26 | End: 2023-11-26 | Stop reason: SDUPTHER

## 2023-11-26 RX ORDER — PROMETHAZINE HYDROCHLORIDE AND DEXTROMETHORPHAN HYDROBROMIDE 6.25; 15 MG/5ML; MG/5ML
5 SYRUP ORAL NIGHTLY PRN
Qty: 118 ML | Refills: 0 | Status: SHIPPED | OUTPATIENT
Start: 2023-11-26 | End: 2023-12-06

## 2023-11-26 RX ORDER — DEXBROMPHENIRAMINE MALEATE, PHENYLEPHRINE HYDROCHLORIDE 2; 7.5 MG/1; MG/1
1 TABLET ORAL EVERY 4 HOURS PRN
Qty: 20 TABLET | Refills: 0 | Status: SHIPPED | OUTPATIENT
Start: 2023-11-26 | End: 2023-12-01

## 2023-11-26 RX ORDER — OSELTAMIVIR PHOSPHATE 75 MG/1
75 CAPSULE ORAL 2 TIMES DAILY
Qty: 10 CAPSULE | Refills: 0 | Status: SHIPPED | OUTPATIENT
Start: 2023-11-26 | End: 2023-12-01

## 2023-11-26 RX ORDER — BALOXAVIR MARBOXIL 80 MG/1
80 TABLET, FILM COATED ORAL ONCE
Qty: 1 TABLET | Refills: 0 | Status: SHIPPED | OUTPATIENT
Start: 2023-11-26 | End: 2023-11-26

## 2023-11-26 NOTE — PROGRESS NOTES
"Subjective:      Patient ID: Sonia Shelley is a 33 y.o. female.    Vitals:  height is 5' 5" (1.651 m) and weight is 120.2 kg (265 lb). Her oral temperature is 99.2 °F (37.3 °C). Her blood pressure is 120/78 and her pulse is 109. Her respiration is 18 and oxygen saturation is 98%.     Chief Complaint: Sore Throat     Patient is a 33 y.o. female who presents to urgent care with complaints of sore throat, congestion, N/V/D, cough, fever, fatigue, body aches that began 2-3 days ago.  Patient reports initial symptoms 3 days ago of nausea vomiting, diarrhea which have all subsided.  Patient's  and children are all here with similar symptoms.  Alleviating factors include dayquil, mucinex, ibuprofen with mild amount of relief.     ROS   Objective:     Physical Exam   Constitutional: She is oriented to person, place, and time. She appears well-developed. She is cooperative.  Non-toxic appearance. She does not appear ill. No distress.   HENT:   Head: Normocephalic and atraumatic.   Ears:   Right Ear: Hearing, tympanic membrane, external ear and ear canal normal.   Left Ear: Hearing, tympanic membrane, external ear and ear canal normal.      Comments: Bilateral TM:  Clear fluid  Nose: Congestion present. No mucosal edema, rhinorrhea or nasal deformity. No epistaxis. Right sinus exhibits no maxillary sinus tenderness and no frontal sinus tenderness. Left sinus exhibits no maxillary sinus tenderness and no frontal sinus tenderness.   Mouth/Throat: Uvula is midline and mucous membranes are normal. Mucous membranes are moist. No trismus in the jaw. Normal dentition. No uvula swelling. Posterior oropharyngeal erythema present. No oropharyngeal exudate or posterior oropharyngeal edema.      Comments: Mild erythema, postnasal drip  Eyes: Conjunctivae and lids are normal. No scleral icterus.   Neck: Trachea normal and phonation normal. Neck supple. No edema present. No erythema present. No neck rigidity present. "   Cardiovascular: Normal rate, regular rhythm, normal heart sounds and normal pulses.   Pulmonary/Chest: Effort normal and breath sounds normal. No respiratory distress. She has no decreased breath sounds. She has no wheezes. She has no rhonchi. She has no rales.   Abdominal: Normal appearance.   Musculoskeletal: Normal range of motion.         General: No deformity. Normal range of motion.   Lymphadenopathy:     She has no cervical adenopathy.   Neurological: She is alert and oriented to person, place, and time. She exhibits normal muscle tone. Coordination normal.   Skin: Skin is warm, dry, intact, not diaphoretic and not pale.   Psychiatric: Her speech is normal and behavior is normal. Judgment and thought content normal.   Nursing note and vitals reviewed.      Assessment:     1. Influenza A        Plan:       Influenza A  -     POCT COVID-19 Rapid Screening  -     POCT Influenza A/B Molecular  -     POCT Strep A, Molecular  -     Discontinue: dexbrompheniramine-phenyleph (ALAHIST PE) 2-7.5 mg Tab; Take 1 tablet by mouth every 4 (four) hours as needed (cough).  Dispense: 20 tablet; Refill: 0  -     promethazine-dextromethorphan (PROMETHAZINE-DM) 6.25-15 mg/5 mL Syrp; Take 5 mLs by mouth nightly as needed (cough).  Dispense: 118 mL; Refill: 0  -     dexbrompheniramine-phenyleph (ALAHIST PE) 2-7.5 mg Tab; Take 1 tablet by mouth every 4 (four) hours as needed (cough).  Dispense: 20 tablet; Refill: 0    Other orders  -     baloxavir marboxiL (XOFLUZA) 80 mg tablet; Take 1 tablet (80 mg total) by mouth once. for 1 dose  Dispense: 1 tablet; Refill: 0  -     oseltamivir (TAMIFLU) 75 MG capsule; Take 1 capsule (75 mg total) by mouth 2 (two) times daily. for 5 days  Dispense: 10 capsule; Refill: 0      Patient is out of the window for Tamiflu.  Negative COVID and strep      Flu A positive    Alahist during the daytime, prescription cough syrup nightly.  Do not combine these medications as they both have antihistamine  which can make you drowsy.    Drink plenty of fluids. Get plenty of rest.    Nasal spray such as Nasacort or Flonase for congestion.  Warm saltwater gargles for sore throat.   Tylenol or ibuprofen as needed for sore throat and fever.   Chloraseptic Spray for worsening sore throat  Call or return to clinic as needed   Go to the ER with any significant change or worsening of symptoms.   Follow up with your primary care doctor.

## 2023-11-26 NOTE — LETTER
November 26, 2023      [facility]  [address] [address]  Phone: [phone]       Patient: Sonia Shelley   YOB: 1990  Date of Visit: 11/26/2023    To Whom It May Concern:    Aliyah Shelley  was at [facility] on 11/26/2023. The patient may return to work/school on 11/29/2023 with no restrictions. If you have any questions or concerns, or if I can be of further assistance, please do not hesitate to contact me.    Sincerely,    Sona Vela MA

## 2023-11-26 NOTE — PATIENT INSTRUCTIONS
Excuse for Monday and Tuesday, may return Wednesday if fever free for 24 hours and symptoms are improving.      Flu A positive    Alahist during the daytime, prescription cough syrup nightly.  Do not combine these medications as they both have antihistamine which can make you drowsy.    Drink plenty of fluids. Get plenty of rest.    Nasal spray such as Nasacort or Flonase for congestion.  Warm saltwater gargles for sore throat.   Tylenol or ibuprofen as needed for sore throat and fever.   Chloraseptic Spray for worsening sore throat  Call or return to clinic as needed   Go to the ER with any significant change or worsening of symptoms.   Follow up with your primary care doctor.

## 2023-12-26 ENCOUNTER — OFFICE VISIT (OUTPATIENT)
Dept: URGENT CARE | Facility: CLINIC | Age: 33
End: 2023-12-26
Payer: COMMERCIAL

## 2023-12-26 VITALS
RESPIRATION RATE: 18 BRPM | HEIGHT: 65 IN | SYSTOLIC BLOOD PRESSURE: 134 MMHG | BODY MASS INDEX: 44.15 KG/M2 | HEART RATE: 98 BPM | OXYGEN SATURATION: 97 % | TEMPERATURE: 100 F | WEIGHT: 265 LBS | DIASTOLIC BLOOD PRESSURE: 70 MMHG

## 2023-12-26 DIAGNOSIS — U07.1 COVID-19: ICD-10-CM

## 2023-12-26 DIAGNOSIS — R05.9 COUGH, UNSPECIFIED TYPE: Primary | ICD-10-CM

## 2023-12-26 LAB
CTP QC/QA: YES
MOLECULAR STREP A: NEGATIVE
POC MOLECULAR INFLUENZA A AGN: NEGATIVE
POC MOLECULAR INFLUENZA B AGN: NEGATIVE
SARS-COV-2 RDRP RESP QL NAA+PROBE: POSITIVE

## 2023-12-26 PROCEDURE — 87502 POCT INFLUENZA A/B MOLECULAR: ICD-10-PCS | Mod: QW,,,

## 2023-12-26 PROCEDURE — 87635 SARS-COV-2 COVID-19 AMP PRB: CPT | Mod: QW,,,

## 2023-12-26 PROCEDURE — 87651 POCT STREP A MOLECULAR: ICD-10-PCS | Mod: QW,,,

## 2023-12-26 PROCEDURE — 99214 OFFICE O/P EST MOD 30 MIN: CPT | Mod: ,,,

## 2023-12-26 PROCEDURE — 87635: ICD-10-PCS | Mod: QW,,,

## 2023-12-26 PROCEDURE — 87502 INFLUENZA DNA AMP PROBE: CPT | Mod: QW,,,

## 2023-12-26 PROCEDURE — 87651 STREP A DNA AMP PROBE: CPT | Mod: QW,,,

## 2023-12-26 PROCEDURE — 99214 PR OFFICE/OUTPT VISIT, EST, LEVL IV, 30-39 MIN: ICD-10-PCS | Mod: ,,,

## 2023-12-26 RX ORDER — PROMETHAZINE HYDROCHLORIDE AND DEXTROMETHORPHAN HYDROBROMIDE 6.25; 15 MG/5ML; MG/5ML
5 SYRUP ORAL EVERY 6 HOURS PRN
Qty: 100 ML | Refills: 0 | Status: SHIPPED | OUTPATIENT
Start: 2023-12-26 | End: 2023-12-31

## 2023-12-26 RX ORDER — ALBUTEROL SULFATE 90 UG/1
2 AEROSOL, METERED RESPIRATORY (INHALATION) EVERY 6 HOURS PRN
Qty: 18 G | Refills: 0 | Status: SHIPPED | OUTPATIENT
Start: 2023-12-26 | End: 2024-12-25

## 2023-12-26 NOTE — PATIENT INSTRUCTIONS
COVID Positive  Caution with the cough medication as it may cause sedation, do not drive or drink alcohol while taking it.   Use the inhaler as needed for chest tightness, wheezing, or shortness of breath   Start the paxlovid today    Start multi vitamin daily. Current CDC guidelines recommend that you quarantine for 5 days starting the day after your symptoms began. Quarantine can end after 5 days as long as the last 24 hours of quarantine you are fever free and there is improvement of all your symptoms. Wear a mask around others for 5 additional days after quarantine. Treat your symptoms as you would the common cold. If you live with anybody, isolate yourself in a separate bedroom and use a separate bathroom. If your symptoms worsen or you develop shortness of breath or a fever over 103, seek medical attention immediately.     Return for a chest xray tomorrow if discomfort with inspiration continues after using the inhaler and starting the covid medication.

## 2023-12-26 NOTE — PROGRESS NOTES
"Subjective:      Patient ID: Sonia Shelley is a 33 y.o. female.    Vitals:  height is 5' 5" (1.651 m) and weight is 120.2 kg (265 lb). Her temperature is 99.6 °F (37.6 °C). Her blood pressure is 134/70 and her pulse is 98. Her respiration is 18 and oxygen saturation is 97%.     Chief Complaint: Other Misc (Chest tightness when coughing and cannot take a deep breath, congestion, green mucus, low grade fever. - Entered by patient)    33 y.o. female presents to clinic w/ c/o fever(T-max 100.2), body aches, chills, congestion, sore throat, pain with inspiration, mild SOB with activity x1d and productive cough x1mo post flu-A. Alleviating factors used include Promethazine DM, Mucinex, Ibuprofen w/ minimal improvement. Denies neck stiffness, wheezing, N/V/D, abdominal pain and rash, hx of asthma, difficulty swallowing, neck stiffness, or changes in intake or output.         Constitution: Positive for chills, fatigue and fever.   HENT:  Positive for postnasal drip and sore throat. Negative for trouble swallowing and voice change.    Eyes: Negative.    Respiratory:  Positive for cough and shortness of breath. Negative for sputum production, wheezing and asthma.    Gastrointestinal: Negative.    Genitourinary: Negative.    Allergic/Immunologic: Negative for asthma.      Objective:     Physical Exam   Constitutional: She is oriented to person, place, and time. She appears well-developed. She is cooperative.  Non-toxic appearance. She does not appear ill. No distress.   HENT:   Head: Normocephalic and atraumatic.   Ears:   Right Ear: Hearing, tympanic membrane and external ear normal.   Left Ear: Hearing, tympanic membrane and external ear normal.   Nose: Congestion present.   Mouth/Throat: Mucous membranes are normal. Mucous membranes are moist. Posterior oropharyngeal erythema present. Oropharynx is clear.   Eyes: Conjunctivae and lids are normal.   Neck: Trachea normal and phonation normal. Neck supple. No edema present. " No erythema present. No neck rigidity present.   Cardiovascular: Normal rate.   Pulmonary/Chest: Effort normal and breath sounds normal. No stridor. No respiratory distress. She has no decreased breath sounds. She has no wheezes. She has no rhonchi. She has no rales.   Abdominal: Normal appearance.   Neurological: She is alert and oriented to person, place, and time. She exhibits normal muscle tone. Coordination normal.   Skin: Skin is warm, dry, intact, not diaphoretic and no rash. Capillary refill takes less than 2 seconds.   Psychiatric: Her speech is normal and behavior is normal. Mood normal.   Nursing note and vitals reviewed.       Previous History      Review of patient's allergies indicates:   Allergen Reactions    Penicillins Hives and Swelling    Amoxicillin Hives    Cephalosporins        Past Medical History:   Diagnosis Date    LYDIA (generalized anxiety disorder)     Migraines     Thyroid disease      Current Outpatient Medications   Medication Instructions    albuterol (VENTOLIN HFA) 90 mcg/actuation inhaler 2 puffs, Inhalation, Every 6 hours PRN, Rescue    azelastine-fluticasone (DYMISTA) 137-50 mcg/spray Spry nassal spray 1 spray, Each Nostril, 2 times daily    chlorhexidine (PERIDEX) 0.12 % solution 2 times daily    levothyroxine (SYNTHROID) 200 mcg, Oral, Before breakfast    meloxicam (MOBIC) 15 mg, Oral, Daily    nirmatrelvir-ritonavir 300 mg (150 mg x 2)-100 mg copackaged tablets (EUA) Take 3 tablets by mouth 2 (two) times daily for 5 days. Each dose contains 2 nirmatrelvir (pink tablets) and 1 ritonavir (white tablet). Take all 3 tablets together    PREVIDENT 5000 BOOSTER PLUS 1.1 % Pste SMARTSIG:To Teeth As Directed    promethazine-dextromethorphan (PROMETHAZINE-DM) 6.25-15 mg/5 mL Syrp 5 mLs, Oral, Every 6 hours PRN    propranoloL (INDERAL LA) 60 mg, Oral, Daily    UBRELVY 100 mg tablet TAKE 1 TABLET BY MOUTH UP TO TWICE DAILY AS NEEDED FOR MIGRAINE HEADACHE     Past Surgical History:  "  Procedure Laterality Date    LYMPH NODE BIOPSY      TONSILLECTOMY      WISDOM TOOTH EXTRACTION       Family History   Problem Relation Age of Onset    Breast cancer Mother 40    Cancer Mother         Breast    Depression Mother     Hyperlipidemia Mother     Macular degeneration Maternal Grandmother     Depression Maternal Grandmother     Kidney disease Maternal Grandmother     Vision loss Maternal Grandmother     Emphysema Maternal Grandfather     Prostate cancer Maternal Grandfather 50    Skin cancer Maternal Grandfather     Cancer Maternal Grandfather         Prostate and Skin    COPD Maternal Grandfather     Heart disease Maternal Grandfather     Emphysema Paternal Grandmother     COPD Paternal Grandmother     Diabetes Paternal Grandmother         Type 2    Skin cancer Maternal Aunt     Brain cancer Other     Leukemia Paternal Uncle 17    Alcohol abuse Father     COPD Paternal Grandfather     Cancer Paternal Uncle     Depression Maternal Aunt     Kidney disease Maternal Aunt        Social History     Tobacco Use    Smoking status: Never     Passive exposure: Never    Smokeless tobacco: Never   Substance Use Topics    Alcohol use: Not Currently     Comment: May drink one drink a month    Drug use: Never        Physical Exam      Vital Signs Reviewed   /70   Pulse 98   Temp 99.6 °F (37.6 °C)   Resp 18   Ht 5' 5" (1.651 m)   Wt 120.2 kg (265 lb)   LMP 12/15/2023   SpO2 97%   BMI 44.10 kg/m²        Procedures    Procedures     Labs     Results for orders placed or performed in visit on 12/26/23   POCT Influenza A/B MOLECULAR   Result Value Ref Range    POC Molecular Influenza A Ag Negative Negative, Not Reported    POC Molecular Influenza B Ag Negative Negative, Not Reported     Acceptable Yes    POCT COVID-19 Rapid Screening   Result Value Ref Range    POC Rapid COVID Positive (A) Negative     Acceptable Yes    POCT Strep A, Molecular   Result Value Ref Range    " Molecular Strep A, POC Negative Negative     Acceptable Yes          Assessment:     1. Cough, unspecified type    2. COVID-19        Plan:       Cough, unspecified type  -     POCT Influenza A/B MOLECULAR  -     POCT COVID-19 Rapid Screening  -     POCT Strep A, Molecular    COVID-19    COVID Positive  Caution with the cough medication as it may cause sedation, do not drive or drink alcohol while taking it.   Use the inhaler as needed for chest tightness, wheezing, or shortness of breath   Start the paxlovid today    Start multi vitamin daily. Current CDC guidelines recommend that you quarantine for 5 days starting the day after your symptoms began. Quarantine can end after 5 days as long as the last 24 hours of quarantine you are fever free and there is improvement of all your symptoms. Wear a mask around others for 5 additional days after quarantine. Treat your symptoms as you would the common cold. If you live with anybody, isolate yourself in a separate bedroom and use a separate bathroom. If your symptoms worsen or you develop shortness of breath or a fever over 103, seek medical attention immediately.     Discussed that we do not have xray tech today. Patient reports concern for post flu pneumonia due to a lingering cough x 1 month. Discussed that these are most likely Covid symptoms today but offered xray at outside clinic or return tomorrow if symptoms persist after starting covid medication. She will return tomorrow if needed.

## 2024-04-17 ENCOUNTER — TELEPHONE (OUTPATIENT)
Dept: INTERNAL MEDICINE | Facility: CLINIC | Age: 34
End: 2024-04-17
Payer: COMMERCIAL

## 2024-04-24 ENCOUNTER — OFFICE VISIT (OUTPATIENT)
Dept: INTERNAL MEDICINE | Facility: CLINIC | Age: 34
End: 2024-04-24
Payer: COMMERCIAL

## 2024-04-24 VITALS
WEIGHT: 263 LBS | SYSTOLIC BLOOD PRESSURE: 116 MMHG | DIASTOLIC BLOOD PRESSURE: 80 MMHG | OXYGEN SATURATION: 97 % | BODY MASS INDEX: 43.82 KG/M2 | HEIGHT: 65 IN | HEART RATE: 88 BPM

## 2024-04-24 DIAGNOSIS — E03.9 ACQUIRED HYPOTHYROIDISM: ICD-10-CM

## 2024-04-24 DIAGNOSIS — Z12.31 BREAST CANCER SCREENING BY MAMMOGRAM: ICD-10-CM

## 2024-04-24 DIAGNOSIS — I10 HYPERTENSION, UNSPECIFIED TYPE: ICD-10-CM

## 2024-04-24 DIAGNOSIS — E66.01 CLASS 3 SEVERE OBESITY WITH BODY MASS INDEX (BMI) OF 40.0 TO 44.9 IN ADULT, UNSPECIFIED OBESITY TYPE, UNSPECIFIED WHETHER SERIOUS COMORBIDITY PRESENT: ICD-10-CM

## 2024-04-24 DIAGNOSIS — Z00.00 WELLNESS EXAMINATION: Primary | ICD-10-CM

## 2024-04-24 DIAGNOSIS — G43.009 MIGRAINE WITHOUT AURA AND WITHOUT STATUS MIGRAINOSUS, NOT INTRACTABLE: ICD-10-CM

## 2024-04-24 PROBLEM — E66.813 CLASS 3 SEVERE OBESITY WITH BODY MASS INDEX (BMI) OF 40.0 TO 44.9 IN ADULT, UNSPECIFIED OBESITY TYPE, UNSPECIFIED WHETHER SERIOUS COMORBIDITY PRESENT: Status: ACTIVE | Noted: 2024-04-24

## 2024-04-24 PROBLEM — Z76.89 ESTABLISHING CARE WITH NEW DOCTOR, ENCOUNTER FOR: Status: ACTIVE | Noted: 2024-04-24

## 2024-04-24 PROCEDURE — 1159F MED LIST DOCD IN RCRD: CPT | Mod: CPTII,,, | Performed by: INTERNAL MEDICINE

## 2024-04-24 PROCEDURE — 3079F DIAST BP 80-89 MM HG: CPT | Mod: CPTII,,, | Performed by: INTERNAL MEDICINE

## 2024-04-24 PROCEDURE — 1160F RVW MEDS BY RX/DR IN RCRD: CPT | Mod: CPTII,,, | Performed by: INTERNAL MEDICINE

## 2024-04-24 PROCEDURE — 3008F BODY MASS INDEX DOCD: CPT | Mod: CPTII,,, | Performed by: INTERNAL MEDICINE

## 2024-04-24 PROCEDURE — 99385 PREV VISIT NEW AGE 18-39: CPT | Mod: ,,, | Performed by: INTERNAL MEDICINE

## 2024-04-24 PROCEDURE — 3074F SYST BP LT 130 MM HG: CPT | Mod: CPTII,,, | Performed by: INTERNAL MEDICINE

## 2024-04-24 RX ORDER — PROPRANOLOL HYDROCHLORIDE 60 MG/1
60 CAPSULE, EXTENDED RELEASE ORAL DAILY
Qty: 90 CAPSULE | Refills: 3 | Status: SHIPPED | OUTPATIENT
Start: 2024-04-24

## 2024-04-24 RX ORDER — PANTOPRAZOLE SODIUM 40 MG/1
40 TABLET, DELAYED RELEASE ORAL DAILY PRN
Qty: 30 TABLET | Refills: 2 | Status: SHIPPED | OUTPATIENT
Start: 2024-04-24 | End: 2025-04-24

## 2024-04-24 NOTE — ASSESSMENT & PLAN NOTE
Headache Education Provided: Stressed importance of good sleep hygiene and stress management.   Medication Overuse Education Provided: Using more than 3 OTC medications per week may worsen headaches.  Lifestyle Modifications Discussed: High intensity interval training has shown to reduce headache frequency. Low carb, high protein diet has shown to reduce headache frequency as well.  Instructed to keep headache journal.    -well controlled with propranolol and Ubrelvy p.r.n.

## 2024-04-24 NOTE — ASSESSMENT & PLAN NOTE

## 2024-04-24 NOTE — ASSESSMENT & PLAN NOTE
-labs are ordered and follow-up with results   -patient is advised on importance of watching her carbohydrate intake and saturated fat intake, making the right nutritional choices and exercising on a regular basis  -order mammogram

## 2024-04-24 NOTE — ASSESSMENT & PLAN NOTE
-on levothyroxine 200 mcg p.o. daily, continue   -TSH will be ordered, tailor treatment as appropriate   -patient understands to take it on an empty stomach

## 2024-05-15 ENCOUNTER — HOSPITAL ENCOUNTER (OUTPATIENT)
Dept: RADIOLOGY | Facility: HOSPITAL | Age: 34
Discharge: HOME | End: 2024-05-15
Attending: INTERNAL MEDICINE
Payer: COMMERCIAL

## 2024-05-15 DIAGNOSIS — Z12.31 BREAST CANCER SCREENING BY MAMMOGRAM: ICD-10-CM

## 2024-05-15 PROCEDURE — 77067 SCR MAMMO BI INCL CAD: CPT | Mod: 26,,, | Performed by: STUDENT IN AN ORGANIZED HEALTH CARE EDUCATION/TRAINING PROGRAM

## 2024-05-15 PROCEDURE — 77063 BREAST TOMOSYNTHESIS BI: CPT | Mod: TC

## 2024-05-15 PROCEDURE — 77063 BREAST TOMOSYNTHESIS BI: CPT | Mod: 26,,, | Performed by: STUDENT IN AN ORGANIZED HEALTH CARE EDUCATION/TRAINING PROGRAM

## 2024-05-16 ENCOUNTER — TELEPHONE (OUTPATIENT)
Dept: INTERNAL MEDICINE | Facility: CLINIC | Age: 34
End: 2024-05-16
Payer: COMMERCIAL

## 2024-05-16 NOTE — TELEPHONE ENCOUNTER
----- Message from Lulu Lawrence MD sent at 5/16/2024  3:13 PM CDT -----  Please contact the patient and let them know that their results were reviewed and TSH is somewhat overcorrected.  At this time I would like to continue her Synthroid 200 mcg for 6 days of the week with half a tablet (100 mcg) on Sunday.  Also her vitamin-D is extremely low and recommend patient take vitamin-D 3 OTC at 5000 IU on a daily basis

## 2024-06-10 ENCOUNTER — TELEPHONE (OUTPATIENT)
Dept: INTERNAL MEDICINE | Facility: CLINIC | Age: 34
End: 2024-06-10
Payer: COMMERCIAL

## 2024-06-10 DIAGNOSIS — E03.9 ACQUIRED HYPOTHYROIDISM: Primary | ICD-10-CM

## 2024-06-10 RX ORDER — LEVOTHYROXINE SODIUM 200 UG/1
200 TABLET ORAL
Qty: 90 TABLET | Refills: 3 | Status: SHIPPED | OUTPATIENT
Start: 2024-06-10

## 2024-06-10 NOTE — TELEPHONE ENCOUNTER
----- Message from Veronica Howard sent at 6/10/2024  1:29 PM CDT -----  Regarding: refills  Who Called: Sonia DICKERSON Travasos    Refill or New Rx:Refill  RX Name and Strength:levothyroxine (SYNTHROID) 200 MCG tablet  How is the patient currently taking it? (ex. 1XDay):1x  Is this a 30 day or 90 day RX:90  Local or Mail Order:local  List of preferred pharmacies on file (remove unneeded): [unfilled]  If different Pharmacy is requested, enter Pharmacy information here including location and phone number:  [facility] - [address]LO[address] 1  Ordering Provider:      Preferred Method of Contact: Phone Call  Patient's Preferred Phone Number on File: [phone]   Best Call Back Number, if different:  Additional Information:

## 2024-07-29 PROBLEM — Z00.00 WELLNESS EXAMINATION: Status: RESOLVED | Noted: 2024-04-24 | Resolved: 2024-07-29

## 2024-10-14 DIAGNOSIS — E03.9 ACQUIRED HYPOTHYROIDISM: Primary | ICD-10-CM

## 2024-10-17 ENCOUNTER — TELEPHONE (OUTPATIENT)
Dept: INTERNAL MEDICINE | Facility: CLINIC | Age: 34
End: 2024-10-17
Payer: COMMERCIAL

## 2024-10-17 NOTE — TELEPHONE ENCOUNTER
----- Message from Med Assistant Bess sent at 10/14/2024  8:13 AM CDT -----  Regarding: PV Thursday 10-24-24       1. Are there any outstanding Labs, imaging or referrals in the patient's chart?      6 month f/u    Non fasting labs ordered    Last wellness 4-24-24         2. . Has the patient been seen in an ER, urgent care clinic, or any other health care    provider since their last visit? If yes when and where?

## 2024-10-24 ENCOUNTER — OFFICE VISIT (OUTPATIENT)
Dept: INTERNAL MEDICINE | Facility: CLINIC | Age: 34
End: 2024-10-24
Payer: COMMERCIAL

## 2024-10-24 VITALS
SYSTOLIC BLOOD PRESSURE: 116 MMHG | OXYGEN SATURATION: 97 % | WEIGHT: 276 LBS | DIASTOLIC BLOOD PRESSURE: 80 MMHG | HEIGHT: 65 IN | HEART RATE: 77 BPM | BODY MASS INDEX: 45.98 KG/M2

## 2024-10-24 DIAGNOSIS — E03.9 ACQUIRED HYPOTHYROIDISM: ICD-10-CM

## 2024-10-24 DIAGNOSIS — E66.813 CLASS 3 SEVERE OBESITY WITH BODY MASS INDEX (BMI) OF 40.0 TO 44.9 IN ADULT, UNSPECIFIED OBESITY TYPE, UNSPECIFIED WHETHER SERIOUS COMORBIDITY PRESENT: Primary | ICD-10-CM

## 2024-10-24 DIAGNOSIS — E66.01 CLASS 3 SEVERE OBESITY WITH BODY MASS INDEX (BMI) OF 40.0 TO 44.9 IN ADULT, UNSPECIFIED OBESITY TYPE, UNSPECIFIED WHETHER SERIOUS COMORBIDITY PRESENT: Primary | ICD-10-CM

## 2024-10-24 RX ORDER — LEVOTHYROXINE SODIUM 200 UG/1
200 TABLET ORAL
Qty: 90 TABLET | Refills: 3 | Status: SHIPPED | OUTPATIENT
Start: 2024-10-24

## 2024-10-24 NOTE — ASSESSMENT & PLAN NOTE

## 2024-10-24 NOTE — ASSESSMENT & PLAN NOTE
-brand name Synthroid 200 mcg p.o. daily, samples provided and [facility]   -follow-up on labs and titrate dose as appropriate

## 2024-10-24 NOTE — PROGRESS NOTES
Subjective:      Patient ID: Sonia Shelley is a 34 y.o. female.    Chief Complaint: Follow-up (6 month Thyroid/)    Sonia is a 34-year-old female who is here today for her six-month follow-up for hypothyroidism.  She has been through genetic testing and was advised on possibly a breast mammogram however held off because her risk was only increased by 20%.    Gets her regular screening.    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  She also feels she is missing out on her kids who were 3, 5, 7 years of age.  Long discussion regarding work life balance.    Labs are pending, patient to get them completed    The patient's Health Maintenance was reviewed and the following appears to be due at this time:   Health Maintenance Due   Topic Date Due    HIV Screening  Never done    Influenza Vaccine (1) 09/01/2024    COVID-19 Vaccine (3 - 2024-25 season) 09/01/2024        Past Medical History:  Past Medical History:   Diagnosis Date    Autoimmune thyroiditis     LYDIA (generalized anxiety disorder)     Hypothyroidism, unspecified     Migraines     Pregnancy induced hypertension     Rh negative status during pregnancy     Thyroid disease      Past Surgical History:   Procedure Laterality Date    LYMPH NODE BIOPSY      TONSILLECTOMY      WISDOM TOOTH EXTRACTION       Review of patient's allergies indicates:   Allergen Reactions    Penicillins Hives and Swelling    Amoxicillin Hives    Cephalosporins      Social History     Socioeconomic History    Marital status:    Occupational History    Occupation: RN     Employer: [employer]   Tobacco Use    Smoking status: Never     Passive exposure: Never    Smokeless tobacco: Never   Substance and Sexual Activity    Alcohol use: Not Currently     Comment: May drink one drink a month    Drug use: Never    Sexual activity: Yes     Partners: Male     Birth control/protection: Abstinence, Rhythm      Social Drivers of Health     Financial Resource Strain: Low Risk  (4/24/2024)    Overall Financial Resource Strain (CARDIA)     Difficulty of Paying Living Expenses: Not hard at all   Food Insecurity: No Food Insecurity (4/24/2024)    Hunger Vital Sign     Worried About Running Out of Food in the Last Year: Never true     Ran Out of Food in the Last Year: Never true   Transportation Needs: No Transportation Needs (4/24/2024)    PRAPARE - Transportation     Lack of Transportation (Medical): No     Lack of Transportation (Non-Medical): No   Physical Activity: Inactive (4/24/2024)    Exercise Vital Sign     Days of Exercise per Week: 0 days     Minutes of Exercise per Session: 0 min   Stress: No Stress Concern Present (4/24/2024)    [employer] - Occupational Stress Questionnaire     Feeling of Stress : Not at all   Housing Stability: High Risk (4/24/2024)    Housing Stability Vital Sign     Unable to Pay for Housing in the Last Year: No     Homeless in the Last Year: No     Family History   Problem Relation Name Age of Onset    Breast cancer Mother Marely 40    Cancer Mother Marely         Breast    Depression Mother Marely     Hyperlipidemia Mother Marely     Macular degeneration Maternal Grandmother Sophia     Depression Maternal Grandmother Sophia     Kidney disease Maternal Grandmother Sophia     Vision loss Maternal Grandmother Sophia     Emphysema Maternal Grandfather Nugier     Prostate cancer Maternal Grandfather Nugier 50    Skin cancer Maternal Grandfather Nugier     Cancer Maternal Grandfather Nugier         Prostate and Skin    COPD Maternal Grandfather Nugier     Heart disease Maternal Grandfather Nugier     Emphysema Paternal Grandmother Elieen     COPD Paternal Grandmother Elieen     Diabetes Paternal Grandmother Elieen         Type 2    Skin cancer Maternal Aunt      Brain cancer Other mat great aunt     Leukemia Paternal Uncle  17    Alcohol abuse Father Gabriel      "COPD Paternal Grandfather Mitchell     Cancer Paternal Uncle Mp     Depression Maternal Aunt Nancy     Kidney disease Maternal Aunt Polina        Review of Systems    A comprehensive review of systems was performed and is negative except for that stated above  Objective:   /80 (BP Location: Left arm, Patient Position: Sitting)   Pulse 77   Ht 5' 5" (1.651 m)   Wt 125.2 kg (276 lb)   LMP 10/14/2024 (Exact Date)   SpO2 97%   BMI 45.93 kg/m²     Physical Exam  Constitutional:       Appearance: Normal appearance. She is obese.   HENT:      Head: Normocephalic and atraumatic.      Nose: Nose normal.      Mouth/Throat:      Mouth: Mucous membranes are moist.      Pharynx: Oropharynx is clear.   Eyes:      Extraocular Movements: Extraocular movements intact.      Pupils: Pupils are equal, round, and reactive to light.   Cardiovascular:      Rate and Rhythm: Normal rate and regular rhythm.      Pulses: Normal pulses.   Pulmonary:      Effort: Pulmonary effort is normal.      Breath sounds: Normal breath sounds.   Abdominal:      General: Bowel sounds are normal.      Palpations: Abdomen is soft.   Musculoskeletal:         General: Normal range of motion.      Cervical back: Normal range of motion and neck supple.   Skin:     General: Skin is warm.   Neurological:      General: No focal deficit present.      Mental Status: She is alert and oriented to person, place, and time. Mental status is at baseline.   Psychiatric:         Mood and Affect: Mood normal.       Assessment/ Plan:   1. Class 3 severe obesity with body mass index (BMI) of 40.0 to 44.9 in adult, unspecified obesity type, unspecified whether serious comorbidity present  Assessment & Plan:  Body mass index is 45.93 kg/m².  Goal BMI <30.  Exercise 5 times a week for 30 minutes per day.  Avoid soda, simple sugars, excessive rice, potatoes or bread. Limit fast foods and fried foods.  Choose complex carbs in moderation (example: green vegetables, " beans, oatmeal). Eat plenty of fresh fruits and vegetables with lean meats daily.  Do not skip meals. Eat a balanced portion size.  Avoid fad diets. Consider permanent healthy life style changes.        2. Acquired hypothyroidism  Assessment & Plan:  -brand name Synthroid 200 mcg p.o. daily, samples provided and [facility]   -follow-up on labs and titrate dose as appropriate    Orders:  -     levothyroxine (SYNTHROID) 200 MCG tablet; Take 1 tablet (200 mcg total) by mouth before breakfast.  Dispense: 90 tablet; Refill: 3

## 2024-11-17 ENCOUNTER — OFFICE VISIT (OUTPATIENT)
Dept: URGENT CARE | Facility: CLINIC | Age: 34
End: 2024-11-17
Payer: COMMERCIAL

## 2024-11-17 VITALS
BODY MASS INDEX: 45.82 KG/M2 | HEIGHT: 65 IN | OXYGEN SATURATION: 99 % | DIASTOLIC BLOOD PRESSURE: 84 MMHG | WEIGHT: 275 LBS | TEMPERATURE: 98 F | SYSTOLIC BLOOD PRESSURE: 115 MMHG | RESPIRATION RATE: 16 BRPM | HEART RATE: 77 BPM

## 2024-11-17 DIAGNOSIS — J02.0 STREP THROAT: Primary | ICD-10-CM

## 2024-11-17 LAB
CTP QC/QA: YES
MOLECULAR STREP A: POSITIVE

## 2024-11-17 PROCEDURE — 96372 THER/PROPH/DIAG INJ SC/IM: CPT | Mod: ,,, | Performed by: FAMILY MEDICINE

## 2024-11-17 PROCEDURE — 87651 STREP A DNA AMP PROBE: CPT | Mod: QW,,, | Performed by: FAMILY MEDICINE

## 2024-11-17 PROCEDURE — 99213 OFFICE O/P EST LOW 20 MIN: CPT | Mod: 25,,, | Performed by: FAMILY MEDICINE

## 2024-11-17 RX ORDER — DEXAMETHASONE SODIUM PHOSPHATE 10 MG/ML
10 INJECTION INTRAMUSCULAR; INTRAVENOUS ONCE
Status: COMPLETED | OUTPATIENT
Start: 2024-11-17 | End: 2024-11-17

## 2024-11-17 RX ORDER — AZITHROMYCIN 250 MG/1
TABLET, FILM COATED ORAL
Qty: 6 TABLET | Refills: 0 | Status: SHIPPED | OUTPATIENT
Start: 2024-11-17 | End: 2024-11-17

## 2024-11-17 RX ORDER — IPRATROPIUM BROMIDE 21 UG/1
2 SPRAY, METERED NASAL 3 TIMES DAILY
Qty: 30 ML | Refills: 0 | Status: SHIPPED | OUTPATIENT
Start: 2024-11-17 | End: 2024-11-22

## 2024-11-17 RX ORDER — AZITHROMYCIN 250 MG/1
TABLET, FILM COATED ORAL
Qty: 6 TABLET | Refills: 0 | Status: SHIPPED | OUTPATIENT
Start: 2024-11-17 | End: 2024-11-22

## 2024-11-17 RX ADMIN — DEXAMETHASONE SODIUM PHOSPHATE 10 MG: 10 INJECTION INTRAMUSCULAR; INTRAVENOUS at 08:11

## 2024-11-17 NOTE — PROGRESS NOTES
"Subjective:      Patient ID: Sonia Shelley is a 34 y.o. female.    Vitals:  height is 5' 5" (1.651 m) and weight is 124.7 kg (275 lb).     Chief Complaint: Sore Throat    Patient is a 34 y.o. female who presents to urgent care with complaints of sore throat, headaches, some GI upset (diarrhea last night) x Friday, worsened around 2am today. Alleviating factors include Motrin with moderate amount of relief. Patient denies any known fever, chest pain, SOB, or any other symptoms. Patient reports that her children tested positive for strep within the last week.     Sore Throat     HENT:  Positive for sore throat.     Objective:     Physical Exam    Assessment:     No diagnosis found.    Plan:       There are no diagnoses linked to this encounter.                "

## 2024-11-17 NOTE — PROGRESS NOTES
Patient ID: [id number]     Chief Complaint: upper respiratory tract infection symptoms    History of Present Illness:     Sonia Shelley is a 34 y.o. female  who presents today for symptoms of Sore Throat    Patient is a 34 y.o. female who presents to urgent care with complaints of sore throat, headaches, some GI upset (diarrhea last night) x Friday, worsened around 2am today. Alleviating factors include Motrin with moderate amount of relief. Patient denies any known fever, chest pain, SOB, or any other symptoms. Patient reports that her children tested positive for strep within the last week.     Pt denies experiencing any fevers, chills, nausea, vomiting, difficulty breathing, dysphagia, or neck stiffness.    Past Medical History:     -------------------------------------    Autoimmune thyroiditis    LYDIA (generalized anxiety disorder)    Hypothyroidism, unspecified    Migraines    Pregnancy induced hypertension    Rh negative status during pregnancy    Thyroid disease        Past Surgical History:   Procedure Laterality Date    LYMPH NODE BIOPSY      TONSILLECTOMY      WISDOM TOOTH EXTRACTION         Review of patient's allergies indicates:   Allergen Reactions    Penicillins Hives and Swelling    Amoxicillin Hives    Cephalosporins        Outpatient Medications Marked as Taking for the 11/17/24 encounter (Office Visit) with PROVID[facility], [facility]   Medication Sig Dispense Refill    levothyroxine (SYNTHROID) 200 MCG tablet Take 1 tablet (200 mcg total) by mouth before breakfast. 90 tablet 3    pantoprazole (PROTONIX) 40 MG tablet Take 1 tablet (40 mg total) by mouth daily as needed. 30 tablet 2    propranoloL (INDERAL LA) 60 MG 24 hr capsule Take 1 capsule (60 mg total) by mouth once daily. 90 capsule 3    UBRELVY 100 mg tablet Take 1 tablet (100 mg total) by mouth daily as needed for Migraine. If symptoms persist or return, may repeat dose after 2 hours. Maximum: 200 mg per 24 hours 8 tablet 5      Current Facility-Administered Medications for the 11/17/24 encounter (Office Visit) with PROVIDER [facility]RGENT [facility]ARE, [facility]   Medication Dose Route Frequency Provider Last Rate Last Admin    dexAMETHasone injection 10 mg  10 mg Intramuscular Once            Social History     Socioeconomic History    Marital status:    Occupational History    Occupation: RN     Employer: [employer]   Tobacco Use    Smoking status: Never     Passive exposure: Never    Smokeless tobacco: Never   Substance and Sexual Activity    Alcohol use: Yes     Comment: rarely    Drug use: Never    Sexual activity: Yes     Partners: Male     Birth control/protection: Abstinence, Rhythm     Social Drivers of Health     Financial Resource Strain: Low Risk  (4/24/2024)    Overall Financial Resource Strain (CARDIA)     Difficulty of Paying Living Expenses: Not hard at all   Food Insecurity: No Food Insecurity (4/24/2024)    Hunger Vital Sign     Worried About Running Out of Food in the Last Year: Never true     Ran Out of Food in the Last Year: Never true   Transportation Needs: No Transportation Needs (4/24/2024)    PRAPARE - Transportation     Lack of Transportation (Medical): No     Lack of Transportation (Non-Medical): No   Physical Activity: Inactive (4/24/2024)    Exercise Vital Sign     Days of Exercise per Week: 0 days     Minutes of Exercise per Session: 0 min   Stress: No Stress Concern Present (4/24/2024)    [employer] - Occupational Stress Questionnaire     Feeling of Stress : Not at all   Housing Stability: High Risk (4/24/2024)    Housing Stability Vital Sign     Unable to Pay for Housing in the Last Year: No     Homeless in the Last Year: No        Family History   Problem Relation Name Age of Onset    Breast cancer Mother Marely 40    Cancer Mother Marely         Breast    Depression Mother Marely     Hyperlipidemia Mother Marely     Macular degeneration Maternal Grandmother Sophia     Depression Maternal  Grandmother Sophia     Kidney disease Maternal Grandmother Sophia     Vision loss Maternal Grandmother Sophia     Emphysema Maternal Grandfather Nugier     Prostate cancer Maternal Grandfather Nugier 50    Skin cancer Maternal Grandfather Nugier     Cancer Maternal Grandfather Nugier         Prostate and Skin    COPD Maternal Grandfather Nugier     Heart disease Maternal Grandfather Nugier     Emphysema Paternal Grandmother Elieen     COPD Paternal Grandmother Elieen     Diabetes Paternal Grandmother Elieen         Type 2    Skin cancer Maternal Aunt      Brain cancer Other mat great aunt     Leukemia Paternal Uncle  17    Alcohol abuse Father Gabriel     COPD Paternal Grandfather Mitchell     Cancer Paternal Uncle Mp     Depression Maternal Aunt Nancy     Kidney disease Maternal Aunt Polina         Subjective:     Review of Systems   Constitutional:  Negative for chills, fever and malaise/fatigue.   HENT:  Positive for sore throat. Negative for congestion, ear discharge, ear pain and sinus pain.    Respiratory:  Negative for cough, sputum production, shortness of breath, wheezing and stridor.    Gastrointestinal:  Positive for diarrhea. Negative for abdominal pain, nausea and vomiting.   Genitourinary:  Negative for dysuria, frequency and urgency.   Musculoskeletal:  Negative for neck pain.   Skin:  Negative for rash.   Neurological:  Positive for headaches.       Objective:     Vitals:    11/17/24 0812   BP: 115/84   Pulse: 77   Resp: 16   Temp: 98 °F (36.7 °C)     Body mass index is 45.76 kg/m².    Physical Exam  Constitutional:       General: She is not in acute distress.     Appearance: Normal appearance. She is not ill-appearing or toxic-appearing.   HENT:      Head: Normocephalic and atraumatic.      Right Ear: Tympanic membrane and ear canal normal.      Left Ear: Tympanic membrane and ear canal normal.      Nose: No congestion or rhinorrhea.      Mouth/Throat:      Pharynx: Oropharynx is clear.  Posterior oropharyngeal erythema present. No oropharyngeal exudate.      Comments: Airway patent, tonsils not visualized.  Eyes:      General:         Right eye: No discharge.         Left eye: No discharge.      Extraocular Movements: Extraocular movements intact.      Conjunctiva/sclera: Conjunctivae normal.   Cardiovascular:      Rate and Rhythm: Normal rate and regular rhythm.      Heart sounds: Normal heart sounds. No murmur heard.     No gallop.   Pulmonary:      Effort: Pulmonary effort is normal. No respiratory distress.      Breath sounds: Normal breath sounds. No stridor. No wheezing, rhonchi or rales.   Chest:      Chest wall: No tenderness.   Musculoskeletal:      Cervical back: No rigidity or tenderness.   Lymphadenopathy:      Cervical: No cervical adenopathy.   Neurological:      Mental Status: She is alert and oriented to person, place, and time. Mental status is at baseline.   Psychiatric:         Mood and Affect: Mood normal.         Behavior: Behavior normal.         Assessment & Plan:       ICD-10-CM ICD-9-CM   1. Strep throat  J02.0 034.0        1. Strep throat  -     POCT Strep A, Molecular  -     dexAMETHasone injection 10 mg  -     ipratropium (ATROVENT) 21 mcg (0.03 %) nasal spray; 2 sprays by Each Nostril route 3 (three) times daily. for 5 days  Dispense: 30 mL; Refill: 0  -     azithromycin (Z-PALMIRA) 250 MG tablet; Take 2 tablets by mouth on day 1; Take 1 tablet by mouth on days 2-5  Dispense: 6 tablet; Refill: 0         Strep positive.  Reports allergies to penicillins, cephalosporins, and doxycycline.  The leaves us with macrolides.  Clindamycin has an increased risk of C diff so we will go with the azithromycin.      Vitals normal, no acute distress, not ill-appearing, normal exam.    Atrovent for the nasal congestion, discussed tolerance.  Dexamethasone for the body aches and severe sore throat.  Pt will return  if symptoms change, worsen, or do not resolved within the expected time  range.

## 2024-11-30 ENCOUNTER — CLINICAL SUPPORT (OUTPATIENT)
Dept: URGENT CARE | Facility: CLINIC | Age: 34
End: 2024-11-30
Payer: COMMERCIAL

## 2024-11-30 DIAGNOSIS — Z23 FLU VACCINE NEED: Primary | ICD-10-CM

## 2024-11-30 PROCEDURE — 90656 IIV3 VACC NO PRSV 0.5 ML IM: CPT | Mod: ,,,

## 2024-11-30 PROCEDURE — 90471 IMMUNIZATION ADMIN: CPT | Mod: ,,,

## 2024-11-30 NOTE — PROGRESS NOTES
Subjective:      Patient ID: Sonia Shelley is a 34 y.o. female.    Vitals:  vitals were not taken for this visit.     Chief Complaint: No chief complaint on file.    HPI  ROS   Objective:     Physical Exam    Assessment:     1. Flu vaccine need        Plan:       Flu vaccine need  -     (VFC) influenza (Flulaval, Fluzone, Fluarix) 45 mcg/0.5 mL IM vaccine (> or = 6 mo) 0.5 mL

## 2024-12-30 ENCOUNTER — OFFICE VISIT (OUTPATIENT)
Dept: URGENT CARE | Facility: CLINIC | Age: 34
End: 2024-12-30
Payer: COMMERCIAL

## 2024-12-30 VITALS
TEMPERATURE: 101 F | HEIGHT: 65 IN | RESPIRATION RATE: 20 BRPM | SYSTOLIC BLOOD PRESSURE: 115 MMHG | HEART RATE: 102 BPM | OXYGEN SATURATION: 96 % | WEIGHT: 283.19 LBS | BODY MASS INDEX: 47.18 KG/M2 | DIASTOLIC BLOOD PRESSURE: 84 MMHG

## 2024-12-30 DIAGNOSIS — J02.0 STREP PHARYNGITIS: Primary | ICD-10-CM

## 2024-12-30 DIAGNOSIS — R50.9 FEVER, UNSPECIFIED FEVER CAUSE: ICD-10-CM

## 2024-12-30 DIAGNOSIS — J02.9 SORE THROAT: ICD-10-CM

## 2024-12-30 DIAGNOSIS — J11.1 FLU: ICD-10-CM

## 2024-12-30 LAB
CTP QC/QA: YES
CTP QC/QA: YES
MOLECULAR STREP A: POSITIVE
POC MOLECULAR INFLUENZA A AGN: POSITIVE
POC MOLECULAR INFLUENZA B AGN: NEGATIVE

## 2024-12-30 RX ORDER — CLINDAMYCIN HYDROCHLORIDE 300 MG/1
300 CAPSULE ORAL 3 TIMES DAILY
Qty: 30 CAPSULE | Refills: 0 | Status: SHIPPED | OUTPATIENT
Start: 2024-12-30 | End: 2025-01-09

## 2024-12-30 RX ORDER — OSELTAMIVIR PHOSPHATE 75 MG/1
75 CAPSULE ORAL 2 TIMES DAILY
Qty: 10 CAPSULE | Refills: 0 | Status: SHIPPED | OUTPATIENT
Start: 2024-12-30 | End: 2025-01-04

## 2024-12-30 RX ORDER — BALOXAVIR MARBOXIL 80 MG/1
80 TABLET, FILM COATED ORAL ONCE
Qty: 1 TABLET | Refills: 0 | Status: SHIPPED | OUTPATIENT
Start: 2024-12-30 | End: 2024-12-31

## 2024-12-30 NOTE — PATIENT INSTRUCTIONS
Strep is positive  Influenza is positive    Flu:  Influenza  is positive  Medication sent to pharmacy take as prescribed  Begin over-the-counter Tylenol and Motrin as needed  May use over-the-counter elderberry, which may help boost your immune system  Over-the-counter Flonase as needed for congestion  Increase fluids intake to prevent dehydration.   Get plenty of rest. May use saline nose spray and humidifer at bedtime. Warm saltwater gargles for sore throat. Warm water with honey to help coat the throat. Throat lozenges. Chloraseptic spray for worsening sore throat. Do not smoke or allow others to smoke around you. Practice good hand hygiene to include frequent hand washing to lessen the likelihood of transmission. Return or seek immediate medical attention for any new or worsening symptoms such as trouble breathing, continued high fever, neck stiffness, rash, or if you do not get better as expected.         Strep:  Medications sent to pharmacy  Monitor for fever  Tylenol or ibuprofen as needed  Warm saltwater gargles  Do not share any food cups drinks or utensils with anybody.  Change your toothbrush after 2 days of antibiotics  Hydrate  Be sure to complete the entire course of antibiotics  Return to clinic or seek medical attention immediately if your symptoms persist or worsen

## 2024-12-30 NOTE — PROGRESS NOTES
"Subjective:      Patient ID: Sonia Shelley is a 34 y.o. female.    Vitals:  height is 5' 5" (1.651 m) and weight is 128.5 kg (283 lb 3.2 oz). Her tympanic temperature is 100.6 °F (38.1 °C) (abnormal). Her blood pressure is 115/84 and her pulse is 102. Her respiration is 20 and oxygen saturation is 96%.     Chief Complaint: Fever (Sore throat, cough, congestion - Entered by patient)     Patient is a 34 y.o. female who presents to urgent care with complaints of  nonproductive cough, sore throat, headache, generalized body aches, fatigue x 3 days; fever TMAX 102.6 starting  x last night. Alleviating factors include Motrin and Nyquil with mild amount of relief. Patient denies shortness of breathe and chest pain.  She recently had a strep throat infection 1 month ago and was treated with azithromycin.     Fever   Associated symptoms include coughing, headaches and a sore throat. Pertinent negatives include no ear pain.     Constitution: Positive for fatigue and fever. Negative for chills and sweating.   HENT:  Positive for sore throat. Negative for ear pain, ear discharge, postnasal drip, sinus pain and sinus pressure.    Neck: neck negative.   Cardiovascular: Negative.    Eyes: Negative.    Respiratory:  Positive for cough. Negative for chest tightness, sputum production and bloody sputum.    Gastrointestinal: Negative.    Genitourinary: Negative.    Musculoskeletal: Negative.    Skin: Negative.    Allergic/Immunologic: Negative.    Neurological:  Positive for headaches. Negative for disorientation and altered mental status.   Hematologic/Lymphatic: Negative.    Psychiatric/Behavioral:  Negative for altered mental status, disorientation and confusion.       Objective:     Physical Exam   Constitutional: She is oriented to person, place, and time. She appears well-developed. She is cooperative.  Non-toxic appearance. She does not appear ill. No distress.   HENT:   Head: Normocephalic and atraumatic.   Ears:   Right " Ear: Hearing, tympanic membrane, external ear and ear canal normal.   Left Ear: Hearing, tympanic membrane, external ear and ear canal normal.   Nose: Congestion present. No mucosal edema, rhinorrhea or nasal deformity. No epistaxis. Right sinus exhibits no maxillary sinus tenderness and no frontal sinus tenderness. Left sinus exhibits no maxillary sinus tenderness and no frontal sinus tenderness.   Mouth/Throat: Uvula is midline and mucous membranes are normal. No trismus in the jaw. Normal dentition. No uvula swelling. Posterior oropharyngeal erythema present. No oropharyngeal exudate or posterior oropharyngeal edema.   Eyes: Conjunctivae and lids are normal. No scleral icterus.   Neck: Trachea normal and phonation normal. Neck supple. No edema present. No erythema present. No neck rigidity present.   Cardiovascular: Normal rate, regular rhythm, normal heart sounds and normal pulses.   Pulmonary/Chest: Effort normal and breath sounds normal. No respiratory distress. She has no decreased breath sounds. She has no wheezes. She has no rhonchi. She has no rales.   Abdominal: Normal appearance.   Musculoskeletal: Normal range of motion.         General: No deformity. Normal range of motion.   Neurological: She is alert and oriented to person, place, and time. She exhibits normal muscle tone. Coordination normal.   Skin: Skin is warm, dry, intact, not diaphoretic and not pale.   Psychiatric: Her speech is normal and behavior is normal. Judgment and thought content normal.   Nursing note and vitals reviewed.         Previous History      Review of patient's allergies indicates:   Allergen Reactions    Penicillins Hives and Swelling    Amoxicillin Hives    Cephalosporins        Past Medical History:   Diagnosis Date    Autoimmune thyroiditis     LYDIA (generalized anxiety disorder)     Hypothyroidism, unspecified     Migraines     Pregnancy induced hypertension     Rh negative status during pregnancy     Thyroid disease       Current Outpatient Medications   Medication Instructions    clindamycin (CLEOCIN) 300 mg, Oral, 3 times daily    levothyroxine (SYNTHROID) 200 mcg, Oral, Before breakfast    pantoprazole (PROTONIX) 40 mg, Oral, Daily PRN    propranoloL (INDERAL LA) 60 mg, Oral, Daily    UBRELVY 100 mg, Oral, Daily PRN, If symptoms persist or return, may repeat dose after 2 hours. Maximum: 200 mg per 24 hours    XOFLUZA 80 mg, Oral, Once     Past Surgical History:   Procedure Laterality Date    LYMPH NODE BIOPSY      TONSILLECTOMY      WISDOM TOOTH EXTRACTION       Family History   Problem Relation Name Age of Onset    Breast cancer Mother Marely 40    Cancer Mother Marely         Breast    Depression Mother Marely     Hyperlipidemia Mother Marely     Macular degeneration Maternal Grandmother Sophia     Depression Maternal Grandmother Sophia     Kidney disease Maternal Grandmother Sophia     Vision loss Maternal Grandmother Sophia     Emphysema Maternal Grandfather Nugier     Prostate cancer Maternal Grandfather Nugier 50    Skin cancer Maternal Grandfather Nugier     Cancer Maternal Grandfather Nugier         Prostate and Skin    COPD Maternal Grandfather Nugier     Heart disease Maternal Grandfather Nugier     Emphysema Paternal Grandmother Elieen     COPD Paternal Grandmother Elieen     Diabetes Paternal Grandmother Elieen         Type 2    Skin cancer Maternal Aunt      Brain cancer Other mat great aunt     Leukemia Paternal Uncle  17    Alcohol abuse Father Gabriel     COPD Paternal Grandfather Mitchell     Cancer Paternal Uncle Mp     Depression Maternal Aunt Nancy     Kidney disease Maternal Aunt Polina        Social History     Tobacco Use    Smoking status: Never     Passive exposure: Never    Smokeless tobacco: Never   Substance Use Topics    Alcohol use: Yes     Comment: rarely    Drug use: Never        Physical Exam      Vital Signs Reviewed   /84 (BP Location: Right arm, Patient Position: Sitting)   Pulse  "102   Temp (!) 100.6 °F (38.1 °C) (Tympanic)   Resp 20   Ht 5' 5" (1.651 m)   Wt 128.5 kg (283 lb 3.2 oz)   LMP 12/07/2024 (Exact Date)   SpO2 96%   BMI 47.13 kg/m²        Procedures    Procedures     Labs     Results for orders placed or performed in visit on 12/30/24   POCT Influenza A/B Molecular    Collection Time: 12/30/24 11:08 AM   Result Value Ref Range    POC Molecular Influenza A Ag Positive (A) Negative    POC Molecular Influenza B Ag Negative Negative     Acceptable Yes    POCT Strep A, Molecular    Collection Time: 12/30/24 11:08 AM   Result Value Ref Range    Molecular Strep A, POC Positive (A) Negative     Acceptable Yes       .Assessment:     1. Strep pharyngitis    2. Sore throat    3. Fever, unspecified fever cause    4. Flu        Plan:   Strep is positive  Influenza is positive    Flu:  Influenza  is positive  Medication sent to pharmacy take as prescribed  Begin over-the-counter Tylenol and Motrin as needed  May use over-the-counter elderberry, which may help boost your immune system  Over-the-counter Flonase as needed for congestion  Increase fluids intake to prevent dehydration.   Get plenty of rest. May use saline nose spray and humidifer at bedtime. Warm saltwater gargles for sore throat. Warm water with honey to help coat the throat. Throat lozenges. Chloraseptic spray for worsening sore throat. Do not smoke or allow others to smoke around you. Practice good hand hygiene to include frequent hand washing to lessen the likelihood of transmission. Return or seek immediate medical attention for any new or worsening symptoms such as trouble breathing, continued high fever, neck stiffness, rash, or if you do not get better as expected.         Strep:  Medications sent to pharmacy  Monitor for fever  Tylenol or ibuprofen as needed  Warm saltwater gargles  Do not share any food cups drinks or utensils with anybody.  Change your toothbrush after 2 days of " antibiotics  Hydrate  Be sure to complete the entire course of antibiotics  Return to clinic or seek medical attention immediately if your symptoms persist or worsen     Strep pharyngitis    Sore throat  -     POCT Strep A, Molecular    Fever, unspecified fever cause  -     POCT Influenza A/B Molecular    Flu    Other orders  -     baloxavir marboxiL (XOFLUZA) 80 mg tablet; Take 1 tablet (80 mg total) by mouth once. for 1 dose  Dispense: 1 tablet; Refill: 0  -     clindamycin (CLEOCIN) 300 MG capsule; Take 1 capsule (300 mg total) by mouth 3 (three) times daily. for 10 days  Dispense: 30 capsule; Refill: 0

## 2024-12-31 RX ORDER — BALOXAVIR MARBOXIL 80 MG/1
80 TABLET, FILM COATED ORAL ONCE
Qty: 1 TABLET | Refills: 0 | Status: SHIPPED | OUTPATIENT
Start: 2024-12-31 | End: 2024-12-31

## 2025-01-24 ENCOUNTER — TELEPHONE (OUTPATIENT)
Dept: ORTHOPEDICS | Facility: CLINIC | Age: 35
End: 2025-01-24
Payer: COMMERCIAL

## 2025-01-24 NOTE — TELEPHONE ENCOUNTER
Attempted to call patient per [facility] request to get her placed on the schedule for a recent fracture, lvm asking her to call back @ [phone] for scheduling.

## 2025-01-28 ENCOUNTER — OFFICE VISIT (OUTPATIENT)
Dept: URGENT CARE | Facility: CLINIC | Age: 35
End: 2025-01-28
Payer: COMMERCIAL

## 2025-01-28 VITALS
WEIGHT: 283 LBS | HEART RATE: 66 BPM | DIASTOLIC BLOOD PRESSURE: 91 MMHG | OXYGEN SATURATION: 97 % | SYSTOLIC BLOOD PRESSURE: 154 MMHG | BODY MASS INDEX: 47.15 KG/M2 | TEMPERATURE: 98 F | HEIGHT: 65 IN | RESPIRATION RATE: 18 BRPM

## 2025-01-28 DIAGNOSIS — R05.9 COUGH, UNSPECIFIED TYPE: ICD-10-CM

## 2025-01-28 DIAGNOSIS — J32.9 SINUSITIS, UNSPECIFIED CHRONICITY, UNSPECIFIED LOCATION: Primary | ICD-10-CM

## 2025-01-28 LAB
CTP QC/QA: YES
CTP QC/QA: YES
MOLECULAR STREP A: NEGATIVE
POC MOLECULAR INFLUENZA A AGN: NEGATIVE
POC MOLECULAR INFLUENZA B AGN: NEGATIVE

## 2025-01-28 PROCEDURE — 87502 INFLUENZA DNA AMP PROBE: CPT | Mod: QW,,, | Performed by: NURSE PRACTITIONER

## 2025-01-28 PROCEDURE — 87651 STREP A DNA AMP PROBE: CPT | Mod: QW,,, | Performed by: NURSE PRACTITIONER

## 2025-01-28 PROCEDURE — 99213 OFFICE O/P EST LOW 20 MIN: CPT | Mod: ,,, | Performed by: NURSE PRACTITIONER

## 2025-01-28 RX ORDER — MECLIZINE HYDROCHLORIDE 25 MG/1
25 TABLET ORAL 3 TIMES DAILY PRN
Qty: 21 TABLET | Refills: 0 | Status: SHIPPED | OUTPATIENT
Start: 2025-01-28

## 2025-01-28 RX ORDER — PROMETHAZINE HYDROCHLORIDE AND DEXTROMETHORPHAN HYDROBROMIDE 6.25; 15 MG/5ML; MG/5ML
7.5 SYRUP ORAL EVERY 6 HOURS PRN
Qty: 120 ML | Refills: 0 | Status: SHIPPED | OUTPATIENT
Start: 2025-01-28 | End: 2025-02-07

## 2025-01-28 RX ORDER — DOXYCYCLINE 100 MG/1
100 CAPSULE ORAL 2 TIMES DAILY
Qty: 14 CAPSULE | Refills: 0 | Status: SHIPPED | OUTPATIENT
Start: 2025-01-28 | End: 2025-02-04

## 2025-01-28 NOTE — PROGRESS NOTES
"Subjective:      Patient ID: Sonia Shelley is a 34 y.o. female.    Vitals:  height is 5' 5" (1.651 m) and weight is 128.4 kg (283 lb). Her temperature is 98.1 °F (36.7 °C). Her blood pressure is 154/91 (abnormal) and her pulse is 66. Her respiration is 18 and oxygen saturation is 97%.     Chief Complaint: Sinus Problem     Patient is a 34 y.o. female who presents to urgent care with complaints of cough, green mucus, sinus congestion x4 days. Alleviating factors include mucinex with mild amount of relief.      ROS   Objective:     Physical Exam   Constitutional: She is oriented to person, place, and time. She appears well-developed. She is cooperative.  Non-toxic appearance. She does not appear ill. No distress.   HENT:   Head: Normocephalic and atraumatic.   Ears:   Right Ear: Hearing, tympanic membrane, external ear and ear canal normal.   Left Ear: Hearing, tympanic membrane, external ear and ear canal normal.   Nose: Nose normal. No mucosal edema, rhinorrhea or nasal deformity. No epistaxis. Right sinus exhibits no maxillary sinus tenderness and no frontal sinus tenderness. Left sinus exhibits no maxillary sinus tenderness and no frontal sinus tenderness.   Mouth/Throat: Uvula is midline, oropharynx is clear and moist and mucous membranes are normal. No trismus in the jaw. Normal dentition. No uvula swelling. No oropharyngeal exudate, posterior oropharyngeal edema or posterior oropharyngeal erythema.   Eyes: Conjunctivae and lids are normal. No scleral icterus.   Neck: Trachea normal and phonation normal. Neck supple. No edema present. No erythema present. No neck rigidity present.   Cardiovascular: Normal rate, regular rhythm, normal heart sounds and normal pulses.   Pulmonary/Chest: Effort normal and breath sounds normal. No respiratory distress. She has no decreased breath sounds. She has no rhonchi.   Abdominal: Normal appearance.   Musculoskeletal: Normal range of motion.         General: No deformity. " "Normal range of motion.   Neurological: She is alert and oriented to person, place, and time. She exhibits normal muscle tone. Coordination normal.   Skin: Skin is warm, dry, intact, not diaphoretic and not pale.   Psychiatric: Her speech is normal and behavior is normal. Judgment and thought content normal.   Nursing note and vitals reviewed.         Previous History      Review of patient's allergies indicates:   Allergen Reactions    Penicillins Hives and Swelling    Amoxicillin Hives    Cephalosporins        Past Medical History:   Diagnosis Date    Autoimmune thyroiditis     LYDIA (generalized anxiety disorder)     Hypothyroidism, unspecified     Migraines     Pregnancy induced hypertension     Rh negative status during pregnancy     Thyroid disease      Current Outpatient Medications   Medication Instructions    doxycycline (VIBRAMYCIN) 100 mg, Oral, 2 times daily    levothyroxine (SYNTHROID) 200 mcg, Oral, Before breakfast    meclizine (ANTIVERT) 25 mg, Oral, 3 times daily PRN    pantoprazole (PROTONIX) 40 mg, Oral, Daily PRN    promethazine-dextromethorphan (PROMETHAZINE-DM) 6.25-15 mg/5 mL Syrp 7.5 mLs, Oral, Every 6 hours PRN    propranoloL (INDERAL LA) 60 mg, Oral, Daily    UBRELVY 100 mg tablet TAKE 1 TABLET BY MOUTH DAILY AS NEEDED FOR MIGRAINE HEADACHE. IF symptoms persist OR return, MAY REPEAT DOSE AFTER 2 hours. max of 200 MG in 24 hours     Past Surgical History:   Procedure Laterality Date    LYMPH NODE BIOPSY      TONSILLECTOMY      WISDOM TOOTH EXTRACTION           Social History     Tobacco Use    Smoking status: Never     Passive exposure: Never    Smokeless tobacco: Never   Substance Use Topics    Alcohol use: Yes     Comment: rarely    Drug use: Never        Physical Exam      Vital Signs Reviewed   BP (!) 154/91   Pulse 66   Temp 98.1 °F (36.7 °C)   Resp 18   Ht 5' 5" (1.651 m)   Wt 128.4 kg (283 lb)   LMP 01/05/2025 (Exact Date)   SpO2 97%   BMI 47.09 kg/m²        Procedures  "   Procedures     Labs     Results for orders placed or performed in visit on 01/28/25   POCT Strep A, Molecular    Collection Time: 01/28/25  8:34 AM   Result Value Ref Range    Molecular Strep A, POC Negative Negative     Acceptable Yes    POCT Influenza A/B Molecular    Collection Time: 01/28/25  8:40 AM   Result Value Ref Range    POC Molecular Influenza A Ag Negative Negative    POC Molecular Influenza B Ag Negative Negative     Acceptable Yes       Assessment:     1. Sinusitis, unspecified chronicity, unspecified location    2. Cough, unspecified type        Plan:       Sinusitis, unspecified chronicity, unspecified location    Cough, unspecified type  -     POCT Influenza A/B Molecular  -     POCT Strep A, Molecular    Other orders  -     doxycycline (VIBRAMYCIN) 100 MG Cap; Take 1 capsule (100 mg total) by mouth 2 (two) times daily. for 7 days  Dispense: 14 capsule; Refill: 0  -     meclizine (ANTIVERT) 25 mg tablet; Take 1 tablet (25 mg total) by mouth 3 (three) times daily as needed for Dizziness.  Dispense: 21 tablet; Refill: 0  -     promethazine-dextromethorphan (PROMETHAZINE-DM) 6.25-15 mg/5 mL Syrp; Take 7.5 mLs by mouth every 6 (six) hours as needed (cough).  Dispense: 120 mL; Refill: 0

## 2025-04-07 DIAGNOSIS — Z13.89 SCREENING FOR CARDIOVASCULAR, RESPIRATORY, AND GENITOURINARY DISEASES: ICD-10-CM

## 2025-04-07 DIAGNOSIS — Z13.83 SCREENING FOR CARDIOVASCULAR, RESPIRATORY, AND GENITOURINARY DISEASES: ICD-10-CM

## 2025-04-07 DIAGNOSIS — Z13.21 SCREENING FOR ENDOCRINE, NUTRITIONAL, METABOLIC AND IMMUNITY DISORDER: ICD-10-CM

## 2025-04-07 DIAGNOSIS — E03.9 ACQUIRED HYPOTHYROIDISM: Primary | ICD-10-CM

## 2025-04-07 DIAGNOSIS — Z13.29 SCREENING FOR ENDOCRINE, NUTRITIONAL, METABOLIC AND IMMUNITY DISORDER: ICD-10-CM

## 2025-04-07 DIAGNOSIS — Z13.6 SCREENING FOR CARDIOVASCULAR, RESPIRATORY, AND GENITOURINARY DISEASES: ICD-10-CM

## 2025-04-07 DIAGNOSIS — I10 HYPERTENSION, UNSPECIFIED TYPE: ICD-10-CM

## 2025-04-07 DIAGNOSIS — E55.9 VITAMIN D DEFICIENCY: ICD-10-CM

## 2025-04-07 DIAGNOSIS — Z13.228 SCREENING FOR ENDOCRINE, NUTRITIONAL, METABOLIC AND IMMUNITY DISORDER: ICD-10-CM

## 2025-04-07 DIAGNOSIS — Z00.00 WELLNESS EXAMINATION: ICD-10-CM

## 2025-04-07 DIAGNOSIS — Z13.0 SCREENING FOR ENDOCRINE, NUTRITIONAL, METABOLIC AND IMMUNITY DISORDER: ICD-10-CM

## 2025-04-14 ENCOUNTER — DOCUMENTATION ONLY (OUTPATIENT)
Facility: CLINIC | Age: 35
End: 2025-04-14
Payer: COMMERCIAL

## 2025-04-14 NOTE — LETTER
2025      AUTHORIZATION FOR RELEASE OF   CONFIDENTIAL INFORMATION    Dear Gentry Wilson,    We are seeing Sonia Shelley, date of birth 1990, in the clinic at [place] [address] [facility]. Lulu Lawrence MD is the patient's PCP. Sonia Shelley has an outstanding lab/procedure at the time we reviewed her chart. In order to help keep her health information updated, she has authorized us to request the following medical record(s):     ( * )  PAP SMEAR                                            Please fax records to [facility], Bhanushali, Reshma A, MD, [phone]     If you have any questions, please contact lizabeth Byers at [phone].           Patient Name: Sonia Shelley  : 1990  Patient Phone #: [phone]

## 2025-04-17 ENCOUNTER — TELEPHONE (OUTPATIENT)
Dept: INTERNAL MEDICINE | Facility: CLINIC | Age: 35
End: 2025-04-17
Payer: COMMERCIAL

## 2025-04-17 NOTE — TELEPHONE ENCOUNTER
----- Message from Med Assistant Bess sent at 4/7/2025  4:14 PM CDT -----  Regarding: PV Friday 4-25-25  Wellness AppointmentFasting wellness labs ordered and ready to do. Last Wellness 4-24-24

## 2025-04-28 ENCOUNTER — LAB VISIT (OUTPATIENT)
Dept: LAB | Facility: HOSPITAL | Age: 35
End: 2025-04-28
Attending: INTERNAL MEDICINE
Payer: COMMERCIAL

## 2025-04-28 DIAGNOSIS — I10 HYPERTENSION, UNSPECIFIED TYPE: ICD-10-CM

## 2025-04-28 DIAGNOSIS — Z13.29 SCREENING FOR ENDOCRINE, NUTRITIONAL, METABOLIC AND IMMUNITY DISORDER: ICD-10-CM

## 2025-04-28 DIAGNOSIS — Z13.6 SCREENING FOR CARDIOVASCULAR, RESPIRATORY, AND GENITOURINARY DISEASES: ICD-10-CM

## 2025-04-28 DIAGNOSIS — Z13.83 SCREENING FOR CARDIOVASCULAR, RESPIRATORY, AND GENITOURINARY DISEASES: ICD-10-CM

## 2025-04-28 DIAGNOSIS — Z00.00 WELLNESS EXAMINATION: ICD-10-CM

## 2025-04-28 DIAGNOSIS — Z13.89 SCREENING FOR CARDIOVASCULAR, RESPIRATORY, AND GENITOURINARY DISEASES: ICD-10-CM

## 2025-04-28 DIAGNOSIS — Z13.21 SCREENING FOR ENDOCRINE, NUTRITIONAL, METABOLIC AND IMMUNITY DISORDER: ICD-10-CM

## 2025-04-28 DIAGNOSIS — Z13.228 SCREENING FOR ENDOCRINE, NUTRITIONAL, METABOLIC AND IMMUNITY DISORDER: ICD-10-CM

## 2025-04-28 DIAGNOSIS — E55.9 VITAMIN D DEFICIENCY: ICD-10-CM

## 2025-04-28 DIAGNOSIS — E03.9 ACQUIRED HYPOTHYROIDISM: ICD-10-CM

## 2025-04-28 DIAGNOSIS — Z13.0 SCREENING FOR ENDOCRINE, NUTRITIONAL, METABOLIC AND IMMUNITY DISORDER: ICD-10-CM

## 2025-04-28 LAB
25(OH)D3+25(OH)D2 SERPL-MCNC: 14 NG/ML (ref 30–80)
ALBUMIN SERPL-MCNC: 3.7 G/DL (ref 3.5–5)
ALBUMIN/GLOB SERPL: 1.1 RATIO (ref 1.1–2)
ALP SERPL-CCNC: 54 UNIT/L (ref 40–150)
ALT SERPL-CCNC: 28 UNIT/L (ref 0–55)
ANION GAP SERPL CALC-SCNC: 7 MEQ/L
AST SERPL-CCNC: 20 UNIT/L (ref 11–45)
BASOPHILS # BLD AUTO: 0.03 X10(3)/MCL
BASOPHILS NFR BLD AUTO: 0.5 %
BILIRUB SERPL-MCNC: 0.3 MG/DL
BUN SERPL-MCNC: 13.9 MG/DL (ref 7–18.7)
CALCIUM SERPL-MCNC: 9.1 MG/DL (ref 8.4–10.2)
CHLORIDE SERPL-SCNC: 110 MMOL/L (ref 98–107)
CHOLEST SERPL-MCNC: 203 MG/DL
CHOLEST/HDLC SERPL: 5 {RATIO} (ref 0–5)
CO2 SERPL-SCNC: 24 MMOL/L (ref 22–29)
CREAT SERPL-MCNC: 0.79 MG/DL (ref 0.55–1.02)
CREAT/UREA NIT SERPL: 18
EOSINOPHIL # BLD AUTO: 0.14 X10(3)/MCL (ref 0–0.9)
EOSINOPHIL NFR BLD AUTO: 2.6 %
ERYTHROCYTE [DISTWIDTH] IN BLOOD BY AUTOMATED COUNT: 13.2 % (ref 11.5–17)
EST. AVERAGE GLUCOSE BLD GHB EST-MCNC: 105.4 MG/DL
GFR SERPLBLD CREATININE-BSD FMLA CKD-EPI: >60 ML/MIN/1.73/M2
GLOBULIN SER-MCNC: 3.4 GM/DL (ref 2.4–3.5)
GLUCOSE SERPL-MCNC: 99 MG/DL (ref 74–100)
HBA1C MFR BLD: 5.3 %
HCT VFR BLD AUTO: 40.8 % (ref 37–47)
HDLC SERPL-MCNC: 39 MG/DL (ref 35–60)
HGB BLD-MCNC: 13 G/DL (ref 12–16)
IMM GRANULOCYTES # BLD AUTO: 0.01 X10(3)/MCL (ref 0–0.04)
IMM GRANULOCYTES NFR BLD AUTO: 0.2 %
LDLC SERPL CALC-MCNC: 139 MG/DL (ref 50–140)
LYMPHOCYTES # BLD AUTO: 2.3 X10(3)/MCL (ref 0.6–4.6)
LYMPHOCYTES NFR BLD AUTO: 42.1 %
MCH RBC QN AUTO: 29.3 PG (ref 27–31)
MCHC RBC AUTO-ENTMCNC: 31.9 G/DL (ref 33–36)
MCV RBC AUTO: 92.1 FL (ref 80–94)
MONOCYTES # BLD AUTO: 0.48 X10(3)/MCL (ref 0.1–1.3)
MONOCYTES NFR BLD AUTO: 8.8 %
NEUTROPHILS # BLD AUTO: 2.5 X10(3)/MCL (ref 2.1–9.2)
NEUTROPHILS NFR BLD AUTO: 45.8 %
NRBC BLD AUTO-RTO: 0 %
PLATELET # BLD AUTO: 313 X10(3)/MCL (ref 130–400)
PMV BLD AUTO: 10.1 FL (ref 7.4–10.4)
POTASSIUM SERPL-SCNC: 4.4 MMOL/L (ref 3.5–5.1)
PROT SERPL-MCNC: 7.1 GM/DL (ref 6.4–8.3)
RBC # BLD AUTO: 4.43 X10(6)/MCL (ref 4.2–5.4)
SODIUM SERPL-SCNC: 141 MMOL/L (ref 136–145)
T3 SERPL-MCNC: 101.01 NG/DL (ref 60–180)
T4 FREE SERPL-MCNC: 0.96 NG/DL (ref 0.7–1.48)
TRIGL SERPL-MCNC: 127 MG/DL (ref 37–140)
TSH SERPL-ACNC: 17.74 UIU/ML (ref 0.35–4.94)
VLDLC SERPL CALC-MCNC: 25 MG/DL
WBC # BLD AUTO: 5.46 X10(3)/MCL (ref 4.5–11.5)

## 2025-04-28 PROCEDURE — 84443 ASSAY THYROID STIM HORMONE: CPT

## 2025-04-28 PROCEDURE — 84480 ASSAY TRIIODOTHYRONINE (T3): CPT

## 2025-04-28 PROCEDURE — 85025 COMPLETE CBC W/AUTO DIFF WBC: CPT

## 2025-04-28 PROCEDURE — 36415 COLL VENOUS BLD VENIPUNCTURE: CPT

## 2025-04-28 PROCEDURE — 80053 COMPREHEN METABOLIC PANEL: CPT

## 2025-04-28 PROCEDURE — 80061 LIPID PANEL: CPT

## 2025-04-28 PROCEDURE — 84439 ASSAY OF FREE THYROXINE: CPT

## 2025-04-28 PROCEDURE — 82306 VITAMIN D 25 HYDROXY: CPT

## 2025-04-28 PROCEDURE — 83036 HEMOGLOBIN GLYCOSYLATED A1C: CPT

## 2025-04-29 ENCOUNTER — OFFICE VISIT (OUTPATIENT)
Dept: INTERNAL MEDICINE | Facility: CLINIC | Age: 35
End: 2025-04-29
Payer: COMMERCIAL

## 2025-04-29 ENCOUNTER — PATIENT MESSAGE (OUTPATIENT)
Dept: INTERNAL MEDICINE | Facility: CLINIC | Age: 35
End: 2025-04-29

## 2025-04-29 VITALS
WEIGHT: 285 LBS | SYSTOLIC BLOOD PRESSURE: 120 MMHG | DIASTOLIC BLOOD PRESSURE: 82 MMHG | BODY MASS INDEX: 47.48 KG/M2 | HEIGHT: 65 IN | OXYGEN SATURATION: 98 % | HEART RATE: 105 BPM

## 2025-04-29 DIAGNOSIS — R00.2 PALPITATIONS: ICD-10-CM

## 2025-04-29 DIAGNOSIS — E66.01 CLASS 3 SEVERE OBESITY DUE TO EXCESS CALORIES WITH SERIOUS COMORBIDITY AND BODY MASS INDEX (BMI) OF 45.0 TO 49.9 IN ADULT: Chronic | ICD-10-CM

## 2025-04-29 DIAGNOSIS — Z00.00 WELLNESS EXAMINATION: ICD-10-CM

## 2025-04-29 DIAGNOSIS — G43.009 MIGRAINE WITHOUT AURA AND WITHOUT STATUS MIGRAINOSUS, NOT INTRACTABLE: Primary | ICD-10-CM

## 2025-04-29 DIAGNOSIS — E03.9 ACQUIRED HYPOTHYROIDISM: ICD-10-CM

## 2025-04-29 DIAGNOSIS — E55.9 VITAMIN D INSUFFICIENCY: ICD-10-CM

## 2025-04-29 DIAGNOSIS — E66.813 CLASS 3 SEVERE OBESITY DUE TO EXCESS CALORIES WITH SERIOUS COMORBIDITY AND BODY MASS INDEX (BMI) OF 45.0 TO 49.9 IN ADULT: Chronic | ICD-10-CM

## 2025-04-29 RX ORDER — LEVOTHYROXINE SODIUM 25 UG/1
25 TABLET ORAL
Qty: 90 TABLET | Refills: 1
Start: 2025-04-29

## 2025-04-29 RX ORDER — CHOLECALCIFEROL (VITAMIN D3) 25 MCG
1000 TABLET ORAL DAILY
COMMUNITY

## 2025-04-29 RX ORDER — LEUCOVORIN, FOLIC ACID, LEVOMEFOLATE MAGNESIUM, FERROUS CYSTEINE GLYCINATE, 1,2-DOCOSAHEXANOYL-SN-GLYCERO-3-PHOSPHOSERINE CALCIUM, 1,2-ICOSAPENTOYL-SN-GLYCERO-3-PHOSPHOSERINE CALCIUM, PHOSPHATIDYL SERINE, PYRIDOXAL 5-PHOSPHATE, FLAVIN ADENINE DINUCLEOTIDE, NADH, COBAMAMIDE, COCARBOXYLASE (THIAMINE PYROPHOSPHATE), MAGNESIUM ASCORBATE, ZINC ASCORBATE, MAGNESIUM L-THREONATE AND BETAINE 2.5; 1; 7; 13.6; 6.4; 800; 12; 25; 25; 25; 50; 25; 24; 1; 1; 500; 1.83; 3.67 MG/1; MG/1; MG/1; MG/1; MG/1; UG/1; MG/1; UG/1; UG/1; UG/1; UG/1; UG/1; MG/1; MG/1; MG/1; UG/1; MG/1; MG/1
1 CAPSULE, DELAYED RELEASE PELLETS ORAL DAILY
Qty: 90 CAPSULE | Refills: 5 | Status: SHIPPED | OUTPATIENT
Start: 2025-04-29

## 2025-04-29 RX ORDER — LIOTHYRONINE SODIUM 5 UG/1
25 TABLET ORAL DAILY
Qty: 30 TABLET | Refills: 11 | Status: SHIPPED | OUTPATIENT
Start: 2025-04-29 | End: 2026-04-29

## 2025-04-29 NOTE — ASSESSMENT & PLAN NOTE
-labs are reviewed all essentially normal except for elevated TSH  -patient is advised on importance of watching her carbohydrate intake and saturated fat intake, making the right nutritional choices and exercising on a regular basis  -up-to-date with the screening

## 2025-04-29 NOTE — ASSESSMENT & PLAN NOTE
-patient currently on propranolol 60 mg p.o. daily, would like to get off of it, adding the supplement that patient wanted Enlyte that she has taken and tolerated in the past  -sending a referral to Cardiology for completion upon patient's request

## 2025-04-29 NOTE — ASSESSMENT & PLAN NOTE
Lab Results   Component Value Date    TSH 17.744 (H) 04/28/2025   -under corrected and symptomatic   -currently on (brand only) Synthroid 200 mcg daily through Synthroid delivers, adding another 25 mcg   -also adding Cytomel 5 mcg p.o. daily   -encouraged to take medication on empty stomach/1-hr prior other medication  -order TSH for next visit, in 8 weeks to monitor closely

## 2025-04-29 NOTE — ASSESSMENT & PLAN NOTE
"Estimated body mass index is 47.43 kg/m² as calculated from the following:    Height as of this encounter: 5' 5" (1.651 m).    Weight as of this encounter: 129.3 kg (285 lb).    - Encourage exorcise for 30-45 min a day, 5x a week   - Log foods and track calories    - Eat lean meats (chicken, turkey, fish, lean ground beef)   - Avoid fried, fatty or processed foods.   - Avoid excessive carbohydrate rich foods (pasta, bread, rice, potatoes)   "

## 2025-04-29 NOTE — PROGRESS NOTES
Patient ID: Sonia Shelley is a 34 y.o. female.    Chief Complaint: Annual Exam (Wellness/)    Sonia is a 34-year-old female who is here today for her wellness visit and six-month follow-up for hypothyroidism  She has been through genetic testing and was advised on possibly a breast mammogram however held off because her risk was only increased by 20%.    Gets her regular screening.    Wellness labs are reviewed and noted to be essentially normal except for uncontrolled hypothyroidism     A separate E/M visit was performed for hypothyroidism  TSH is noted to be above 17.  Patient currently on Synthroid 200 mcg p.o. daily however endorses to having taken the medication with the propranolol.    Considering TSH is elevated, patient is advised to take the medication by itself.  Also adding Cytomel 5 mcg p.o. daily on increasing the dose of the Synthroid to 225 mcg p.o. daily.    We will recheck TSH in 8 weeks.    She does have some symptoms with palpitations however would like to get off of propranolol and try a food supplement that had worked for her in the past in controlling her migraines as well as her palpitations.    She is worried about cardiac health considering she has had COVID-19 4 times and her obesity and would like to be evaluated for a baseline with Cardiology and a referral was sent.      The patient's Health Maintenance was reviewed and the following appears to be due at this time:   History of Present Illness             Wellness:The patient doesn't have any registry metric data available     MEDICAL HISTORY:    Past Medical History:   Diagnosis Date    Autoimmune thyroiditis     LYDIA (generalized anxiety disorder)     Hypothyroidism, unspecified     Migraines     Pregnancy induced hypertension     Rh negative status during pregnancy     Thyroid disease     Vitamin D insufficiency 04/29/2025      Past Surgical History:   Procedure Laterality Date    LYMPH NODE BIOPSY      TONSILLECTOMY      AYAZ  "TOOTH EXTRACTION        Social History[1]       Health Maintenance Due   Topic Date Due    HIV Screening  Never done    COVID-19 Vaccine (3 - 2024-25 season) 09/01/2024    Cervical Cancer Screening  04/12/2025          Patient Care Team:  Lulu Lawrence MD as PCP - General (Internal Medicine)  Gentry Arceo MD as Consulting Physician (Obstetrics and Gynecology)  Fredy Marquez MD as Consulting Physician (Neurology)  Kory Souza MD as Consulting Physician (Endocrinology)  Luke Harris Jr., MD as Consulting Physician (Family Medicine)      Review of Systems    A comprehensive review of systems is obtained and is essentially negative except for that stated in the HPI   Objective:   /82 (BP Location: Left arm, Patient Position: Sitting)   Pulse 105   Ht 5' 5" (1.651 m)   Wt 129.3 kg (285 lb)   LMP 04/23/2025 (Exact Date)   SpO2 98%   BMI 47.43 kg/m²      Physical Exam  Constitutional:       Appearance: Normal appearance. She is obese.   HENT:      Head: Normocephalic and atraumatic.      Nose: Nose normal.      Mouth/Throat:      Mouth: Mucous membranes are moist.      Pharynx: Oropharynx is clear.   Eyes:      Extraocular Movements: Extraocular movements intact.      Pupils: Pupils are equal, round, and reactive to light.   Neck:      Comments: Normal thyroid, no nodules felt  Cardiovascular:      Rate and Rhythm: Normal rate and regular rhythm.      Pulses: Normal pulses.   Pulmonary:      Effort: Pulmonary effort is normal.      Breath sounds: Normal breath sounds.   Abdominal:      General: Bowel sounds are normal.      Palpations: Abdomen is soft.   Musculoskeletal:         General: Normal range of motion.      Cervical back: Normal range of motion and neck supple.   Skin:     General: Skin is warm.   Neurological:      General: No focal deficit present.      Mental Status: She is alert and oriented to person, place, and time. Mental status is at baseline.   Psychiatric:    " "     Mood and Affect: Mood normal.           Assessment:       ICD-10-CM ICD-9-CM   1. Migraine without aura and without status migrainosus, not intractable  G43.009 346.10   2. Acquired hypothyroidism  E03.9 244.9   3. Class 3 severe obesity due to excess calories with serious comorbidity and body mass index (BMI) of 45.0 to 49.9 in adult  E66.813 278.01    Z68.42 V85.42    E66.01    4. Vitamin D insufficiency  E55.9 268.9   5. Palpitations  R00.2 785.1   6. Wellness examination  Z00.00 V70.0        Plan:   1. Migraine without aura and without status migrainosus, not intractable  Assessment & Plan:  -stable   -current preventative therapy: Propranolol 60 mg q.d. however patient is wanting to get off of it  -current abortive therapy: Ubrelvy 100 mg q.d. PRN  -okay to utilize OTC Tylenol/ibuprofen initially  -avoid triggers      2. Acquired hypothyroidism  Overview:  2021: Thyroid US: IMPRESSION: Heterogeneous and mildly enlarged thyroid with suspected  mild hypervascularity of the right lobe. No defined nodule  appreciated.    Assessment & Plan:  Lab Results   Component Value Date    TSH 17.744 (H) 04/28/2025   -under corrected and symptomatic   -currently on (brand only) Synthroid 200 mcg daily through Synthroid delivers, adding another 25 mcg   -also adding Cytomel 5 mcg p.o. daily   -encouraged to take medication on empty stomach/1-hr prior other medication  -order TSH for next visit, in 8 weeks to monitor closely      Orders:  -     levothyroxine (SYNTHROID) 25 MCG tablet; Take 1 tablet (25 mcg total) by mouth before breakfast.  Dispense: 90 tablet; Refill: 1    3. Class 3 severe obesity due to excess calories with serious comorbidity and body mass index (BMI) of 45.0 to 49.9 in adult  Assessment & Plan:  Estimated body mass index is 47.43 kg/m² as calculated from the following:    Height as of this encounter: 5' 5" (1.651 m).    Weight as of this encounter: 129.3 kg (285 lb).    - Encourage exorcise for 30-45 " min a day, 5x a week   - Log foods and track calories    - Eat lean meats (chicken, turkey, fish, lean ground beef)   - Avoid fried, fatty or processed foods.   - Avoid excessive carbohydrate rich foods (pasta, bread, rice, potatoes)       4. Vitamin D insufficiency  Assessment & Plan:  Lab Results   Component Value Date    [id number] 14 (L) 04/28/2025          5. Palpitations  Assessment & Plan:  -patient currently on propranolol 60 mg p.o. daily, would like to get off of it, adding the supplement that patient wanted Enlyte that she has taken and tolerated in the past  -sending a referral to Cardiology for completion upon patient's request    Orders:  -     Ambulatory referral/consult to Cardiology; Future; Expected date: 05/06/2025    6. Wellness examination  Overview:  -history has been reviewed and noted in detail   -will go ahead and get patient's mammogram ordered    Assessment & Plan:  -labs are reviewed all essentially normal except for elevated TSH  -patient is advised on importance of watching her carbohydrate intake and saturated fat intake, making the right nutritional choices and exercising on a regular basis  -up-to-date with the screening       Other orders  -     iron-FA-dha-epa-FAD-NADH-be-mv (ENLYTE) 1.5 mg iron- 8.73 mg CpID; Take 1 capsule by mouth once daily.  Dispense: 90 capsule; Refill: 5  -     liothyronine (CYTOMEL) 5 MCG Tab; Take 5 tablets (25 mcg total) by mouth once daily.  Dispense: 30 tablet; Refill: 11         Assessment & Plan                  Follow up in about 7 weeks (around 6/17/2025) for Thyroid, with labs prior, with me, Telemed okay.   -plan specifics discussed above    Orders Placed This Encounter    Ambulatory referral/consult to Cardiology    iron-FA-dha-epa-FAD-NADH-be-mv (ENLYTE) 1.5 mg iron- 8.73 mg CpID    liothyronine (CYTOMEL) 5 MCG Tab    levothyroxine (SYNTHROID) 25 MCG tablet        Medication List with Changes/Refills   New Medications     IRON-FA-DHA-EPA-FAD-NADH-BE-MV (ENLYTE) 1.5 MG IRON- 8.73 MG CPID    Take 1 capsule by mouth once daily.    LIOTHYRONINE (CYTOMEL) 5 MCG TAB    Take 5 tablets (25 mcg total) by mouth once daily.   Current Medications    PROPRANOLOL (INDERAL LA) 60 MG 24 HR CAPSULE    Take 1 capsule (60 mg total) by mouth once daily.    UBRELVY 100 MG TABLET    TAKE 1 TABLET BY MOUTH DAILY AS NEEDED FOR MIGRAINE HEADACHE. IF symptoms persist OR return, MAY REPEAT DOSE AFTER 2 hours. max of 200 MG in 24 hours    VITAMIN D (VITAMIN D3) 1000 UNITS TAB    Take 1,000 Units by mouth once daily.   Changed and/or Refilled Medications    Modified Medication Previous Medication    LEVOTHYROXINE (SYNTHROID) 25 MCG TABLET levothyroxine (SYNTHROID) 200 MCG tablet       Take 1 tablet (25 mcg total) by mouth before breakfast.    Take 1 tablet (200 mcg total) by mouth before breakfast.   Discontinued Medications    MECLIZINE (ANTIVERT) 25 MG TABLET    Take 1 tablet (25 mg total) by mouth 3 (three) times daily as needed for Dizziness.    PANTOPRAZOLE (PROTONIX) 40 MG TABLET    Take 1 tablet (40 mg total) by mouth daily as needed.      This note was generated with the assistance of ambient listening technology. I attest to having reviewed and edited the generated note for accuracy, though some syntax or spelling errors may persist. Please contact the author of this note for any clarification.          [1]   Social History  Tobacco Use    Smoking status: Never     Passive exposure: Never    Smokeless tobacco: Never   Substance Use Topics    Alcohol use: Not Currently     Comment: May drink one drink a month    Drug use: Never

## 2025-04-29 NOTE — ASSESSMENT & PLAN NOTE
-stable   -current preventative therapy: Propranolol 60 mg q.d. however patient is wanting to get off of it  -current abortive therapy: Ubrelvy 100 mg q.d. PRN  -okay to utilize OTC Tylenol/ibuprofen initially  -avoid triggers

## 2025-05-07 ENCOUNTER — TELEPHONE (OUTPATIENT)
Dept: INTERNAL MEDICINE | Facility: CLINIC | Age: 35
End: 2025-05-07
Payer: COMMERCIAL

## 2025-05-07 NOTE — TELEPHONE ENCOUNTER
----- Message from Monica sent at 5/7/2025  1:56 PM CDT -----  Regarding: referral update  Good Afternoon,,This is Monica with [facility](referral scheduling).  I contacted  [facility]( Dr Soraino) to follow up on  referral.  Steph stated they do not have referral and to fax to [phone]   Your assistance is greatly appreciated.   Thanks

## 2025-05-29 ENCOUNTER — OFFICE VISIT (OUTPATIENT)
Dept: URGENT CARE | Facility: CLINIC | Age: 35
End: 2025-05-29
Payer: COMMERCIAL

## 2025-05-29 VITALS
WEIGHT: 285 LBS | TEMPERATURE: 99 F | SYSTOLIC BLOOD PRESSURE: 121 MMHG | OXYGEN SATURATION: 95 % | BODY MASS INDEX: 47.48 KG/M2 | DIASTOLIC BLOOD PRESSURE: 80 MMHG | HEART RATE: 96 BPM | RESPIRATION RATE: 16 BRPM | HEIGHT: 65 IN

## 2025-05-29 DIAGNOSIS — J02.9 SORE THROAT: ICD-10-CM

## 2025-05-29 DIAGNOSIS — J01.40 ACUTE NON-RECURRENT PANSINUSITIS: Primary | ICD-10-CM

## 2025-05-29 DIAGNOSIS — H66.91 ACUTE RIGHT OTITIS MEDIA: ICD-10-CM

## 2025-05-29 LAB
CTP QC/QA: YES
CTP QC/QA: YES
MOLECULAR STREP A: NEGATIVE
SARS-COV+SARS-COV-2 AG RESP QL IA.RAPID: NEGATIVE

## 2025-05-29 RX ORDER — AZITHROMYCIN 500 MG/1
500 TABLET, FILM COATED ORAL DAILY
Qty: 5 TABLET | Refills: 0 | Status: SHIPPED | OUTPATIENT
Start: 2025-05-29 | End: 2025-06-03

## 2025-05-29 RX ORDER — BETAMETHASONE SODIUM PHOSPHATE AND BETAMETHASONE ACETATE 3; 3 MG/ML; MG/ML
9 INJECTION, SUSPENSION INTRA-ARTICULAR; INTRALESIONAL; INTRAMUSCULAR; SOFT TISSUE ONCE
Status: COMPLETED | OUTPATIENT
Start: 2025-05-29 | End: 2025-05-29

## 2025-05-29 RX ORDER — PREDNISONE 20 MG/1
20 TABLET ORAL 2 TIMES DAILY
Qty: 6 TABLET | Refills: 0 | Status: SHIPPED | OUTPATIENT
Start: 2025-05-31 | End: 2025-06-03

## 2025-05-29 RX ADMIN — BETAMETHASONE SODIUM PHOSPHATE AND BETAMETHASONE ACETATE 9 MG: 3; 3 INJECTION, SUSPENSION INTRA-ARTICULAR; INTRALESIONAL; INTRAMUSCULAR; SOFT TISSUE at 08:05

## 2025-05-29 NOTE — PROGRESS NOTES
"Subjective:      Patient ID: Sonia Shelley is a 34 y.o. female.    Vitals:  height is 5' 5" (1.651 m) and weight is 129.3 kg (285 lb). Her temperature is 98.5 °F (36.9 °C). Her blood pressure is 121/80 and her pulse is 96. Her respiration is 16 and oxygen saturation is 95%.     Chief Complaint: Sinus Problem     Patient is a 34 y.o. female who presents to urgent care with complaints of body aches, chills, sore throat, sinus congestion, low grade temp x Sunday . Alleviating factors include mucinex and flonase with mild relief.     ROS :  Constitutional : _ fever , Body aches, Chills  Eyes : _No redness, drainage or pain  HENT_sore throat, postnasal drainage  Respiratory_no wheezing, no shortness of breath  Cardiovascular_no chest pain  Gastrointestinal_ No vomiting, No diarrhea, No abdominal pain  Musculoskeletal_no joint pain, no joint swelling  Integumentary_no skin rash     [place]:  34-year-old female with known history of hypothyroidism currently on medications.  Present to clinic with concerns of low-grade fever, body aches, chills congestion and sore throat.  Symptoms started 4-5 days.  Gradual in onset and worsening.  Over-the-counter medications some help.  Reviewed the vital signs, temp in the clinic 98.5.  Patient denies chest pain shortness a breath or wheezing.  Patient requesting for cortisone injection as it helped in the past, understands the risks and benefits.  Repeat O2 sats 97%      Objective:     Physical Exam  General : Alert and Oriented, No apparent distress, afebrile, sounds stuffy congested and nasal twang to voice  Neck - supple  HENT : Oropharynx and uvula appears erythematous and swollen, tonsils absent, bilateral TM intact, appears dull, right TM appears erythematous, left TM very mild redness  Respiratory : Bilateral equal breath sounds, nonlabored respirations  Cardiovascular : Rate, rhythm regular, normal volume pulse, no murmur  Gastrointestinal: Full abdomen, soft, nontender to " palpate  Integumentary : Warm, Dry and no rash    Assessment:     1. Acute non-recurrent pansinusitis    2. Sore throat    3. Acute right otitis media      Plan:   Discussed the condition and course.  Antihistamine of choice like Claritin Zyrtec or Allegra for congestion, Flonase for 2 weeks  Can alternate Tylenol and ibuprofen every 4 hours for pain and fever  Celestone IM today as anti inflammation for symptom relief, risk and benefits discussed voiced understanding  Can start prednisone after 2 days for persistent and worsening symptoms only as needed  Discussed in detail on azithromycin, duration of action.  Patient voiced understanding  Adequate hydration.  Rest.  Monitor the symptoms    Strep test negative, COVID-19 test negative    For right ear infection medication and symptomatic treatment as above  Call or return to clinic for any questions.  Voiced understanding    Work excuse 2 days    Acute non-recurrent pansinusitis  -     betamethasone acetate-betamethasone sodium phosphate injection 9 mg  -     azithromycin (ZITHROMAX) 500 MG tablet; Take 1 tablet (500 mg total) by mouth once daily. for 5 doses  Dispense: 5 tablet; Refill: 0  -     predniSONE (DELTASONE) 20 MG tablet; Take 1 tablet (20 mg total) by mouth 2 (two) times daily. for 3 days  Dispense: 6 tablet; Refill: 0    Sore throat  -     POCT Strep A, Molecular  -     SARS Coronavirus 2 Antigen, POCT Manual Read    Acute right otitis media  -     betamethasone acetate-betamethasone sodium phosphate injection 9 mg  -     azithromycin (ZITHROMAX) 500 MG tablet; Take 1 tablet (500 mg total) by mouth once daily. for 5 doses  Dispense: 5 tablet; Refill: 0  -     predniSONE (DELTASONE) 20 MG tablet; Take 1 tablet (20 mg total) by mouth 2 (two) times daily. for 3 days  Dispense: 6 tablet; Refill: 0

## 2025-05-29 NOTE — LETTER
May 29, 2025      [facility] [place] [address]  [facility] [address]  Phone: [phone]       Patient: Sonia Shelley   YOB: 1990  Date of Visit: 05/29/2025    To Whom It May Concern:    Aliyah Shelley  was at [facility] on 05/29/2025. The patient may return to work/school on 06/01/2025 with no restrictions. If you have any questions or concerns, or if I can be of further assistance, please do not hesitate to contact me.    Sincerely,    Angella Beltran MD

## 2025-05-29 NOTE — PATIENT INSTRUCTIONS
Discussed the condition and course.  Antihistamine of choice like Claritin Zyrtec or Allegra for congestion, Flonase for 2 weeks  Can alternate Tylenol and ibuprofen every 4 hours for pain and fever  Celestone IM today as anti inflammation for symptom relief, risk and benefits discussed voiced understanding  Can start prednisone after 2 days for persistent and worsening symptoms only as needed  Discussed in detail on azithromycin, duration of action.  Patient voiced understanding  Adequate hydration.  Rest.  Monitor the symptoms    Strep test negative, COVID-19 test negative    For right ear infection medication and symptomatic treatment as above  Call or return to clinic for any questions.  Voiced understanding    Work excuse 2 days

## 2025-06-09 ENCOUNTER — PATIENT MESSAGE (OUTPATIENT)
Dept: INTERNAL MEDICINE | Facility: CLINIC | Age: 35
End: 2025-06-09
Payer: COMMERCIAL

## 2025-06-09 DIAGNOSIS — E03.9 ACQUIRED HYPOTHYROIDISM: Primary | ICD-10-CM

## 2025-06-09 RX ORDER — LEVOTHYROXINE SODIUM 25 UG/1
25 TABLET ORAL
Qty: 30 TABLET | Refills: 0 | Status: SHIPPED | OUTPATIENT
Start: 2025-06-09 | End: 2026-06-09

## 2025-06-09 NOTE — TELEPHONE ENCOUNTER
Spoke with patient. She wanted synthroid 25 sent to [facility] local. She has up coming appt and will see if dose needs to be changed. She has 200 mg on hand already.

## 2025-06-12 DIAGNOSIS — E03.9 ACQUIRED HYPOTHYROIDISM: Primary | ICD-10-CM

## 2025-06-16 ENCOUNTER — TELEPHONE (OUTPATIENT)
Dept: INTERNAL MEDICINE | Facility: CLINIC | Age: 35
End: 2025-06-16
Payer: COMMERCIAL

## 2025-06-16 NOTE — TELEPHONE ENCOUNTER
----- Message from Med Assistant Bess sent at 6/12/2025  4:49 PM CDT -----  Regarding: PV Monday 6-23-25       1. Are there any outstanding Labs, imaging or referrals in the patient's chart?      8 weeks f/u    Non fasting lab ordered    Last wellness 4-29-25         2. . Has the patient been seen in an ER, urgent care clinic, or any other health care    provider since their last visit? If yes when and where?

## 2025-06-25 ENCOUNTER — LAB VISIT (OUTPATIENT)
Dept: LAB | Facility: HOSPITAL | Age: 35
End: 2025-06-25
Attending: INTERNAL MEDICINE
Payer: COMMERCIAL

## 2025-06-25 DIAGNOSIS — E03.9 ACQUIRED HYPOTHYROIDISM: ICD-10-CM

## 2025-06-25 LAB
T3 SERPL-MCNC: 173.65 NG/DL (ref 60–180)
T4 FREE SERPL-MCNC: 1.48 NG/DL (ref 0.7–1.48)
TSH SERPL-ACNC: 0.02 UIU/ML (ref 0.35–4.94)

## 2025-06-25 PROCEDURE — 84480 ASSAY TRIIODOTHYRONINE (T3): CPT

## 2025-06-25 PROCEDURE — 84439 ASSAY OF FREE THYROXINE: CPT

## 2025-06-25 PROCEDURE — 36415 COLL VENOUS BLD VENIPUNCTURE: CPT

## 2025-06-25 PROCEDURE — 84443 ASSAY THYROID STIM HORMONE: CPT

## 2025-06-26 ENCOUNTER — TELEPHONE (OUTPATIENT)
Dept: INTERNAL MEDICINE | Facility: CLINIC | Age: 35
End: 2025-06-26

## 2025-06-26 ENCOUNTER — OFFICE VISIT (OUTPATIENT)
Dept: INTERNAL MEDICINE | Facility: CLINIC | Age: 35
End: 2025-06-26
Payer: COMMERCIAL

## 2025-06-26 VITALS
DIASTOLIC BLOOD PRESSURE: 80 MMHG | SYSTOLIC BLOOD PRESSURE: 121 MMHG | RESPIRATION RATE: 16 BRPM | BODY MASS INDEX: 47.48 KG/M2 | WEIGHT: 285 LBS | HEIGHT: 65 IN | OXYGEN SATURATION: 99 % | HEART RATE: 96 BPM

## 2025-06-26 DIAGNOSIS — Z12.31 BREAST CANCER SCREENING BY MAMMOGRAM: ICD-10-CM

## 2025-06-26 DIAGNOSIS — E03.9 ACQUIRED HYPOTHYROIDISM: Primary | ICD-10-CM

## 2025-06-26 DIAGNOSIS — R00.2 PALPITATIONS: ICD-10-CM

## 2025-06-26 RX ORDER — LEVOTHYROXINE SODIUM 200 UG/1
200 TABLET ORAL
Qty: 30 TABLET | Refills: 3 | Status: SHIPPED | OUTPATIENT
Start: 2025-06-26

## 2025-06-26 NOTE — ASSESSMENT & PLAN NOTE
TSH   Date Value Ref Range Status   06/25/2025 0.024 (L) 0.350 - 4.940 uIU/mL Final   04/28/2025 17.744 (H) 0.350 - 4.940 uIU/mL Final   05/15/2024 0.213 (L) 0.350 - 4.940 uIU/mL Final      Currently on Synthroid 225mcg daily and Cytomel 25mcg daily.  Decrease Synthroid to 200mcg daily and continue Cytomel 25mcg daily.  Repeat TSH in 8-12 weeks.  Take medicine on an empty stomach with water (no other medications or beverages). Wait 30 minutes to eat or drink.  Report any symptoms of thinning hair, breaking nails, fatigue, weight gain or loss, palpitations.

## 2025-06-26 NOTE — PROGRESS NOTES
Internal Medicine        Patient Name:  Sonia Shelley  Patient ID: [id number]     Chief Complaint: 8 week f/u      HPI:    This is a telemedicine note. Patient was treated using telemedicine, real time audio and video, according to [facility] protocols. Lynette LUIS FNP, conducted the visit from the [facility]. The patient participated in the visit at a non-[facility] location selected by the patient, identified below. I am licensed in the state where the patient stated they are located. The patient stated that they understood and accepted the privacy and security risks to their information at their location. This visit is not recorded.    Each patient to whom he or she provides medical services by telemedicine is:  (1) informed of the relationship between the physician and patient and the respective role of any other health care provider with respect to management of the patient; and (2) notified that he or she may decline to receive medical services by telemedicine and may withdraw from such care at any time.     Patient was located at the patient's home.    Visit type: audiovisual    Face to Face time with patient: 18  35 minutes of total time spent on the encounter, which includes face to face time and non-face to face time preparing to see the patient (eg, review of tests), Obtaining and/or reviewing separately obtained history, Documenting clinical information in the electronic or other health record, Independently interpreting results (not separately reported) and communicating results to the patient/family/caregiver, or Care coordination (not separately reported).     Notes:    Sonia Shelley is a 34 y.o. female, known to Dr Lawrence, is here today for a telemedicine visit.  Medical comorbidities significant for hypothyroidism, migraine, vitamin-D insufficiency, obesity.  She has been through genetic testing and was advised on possibly a breast mammogram however held off because her  risk was only increased by 20%.  Gets her regular screening.      History of Present Illness    CHIEF COMPLAINT:  Ms. Shelley presents today for follow up of thyroid issues.    THYROID HISTORY:  She has been on thyroid medication since age 9 when a goiter was discovered during a poison ivy exam.  At [facility], TSH noted to be above 17 on Synthroid (brand only) 200 mcg daily.  Did have some symptoms of palpitations.  Cytomel 5 mcg daily was added along with increase in Synthroid to 225 mcg daily.  Repeat TSH now overcorrected (0.024).  She states she was previously taking Synthroid and propranolol together in the morning, which may have contributed to elevated TSH. Her last thyroid ultrasound was in November 2021.    CARDIAC SYMPTOMS:  She reports intermittent palpitations and chest tightness. She experienced an episode of tachycardia in [place] during mass, with heart rate of 135 BPM lasting approximately 10 minutes, accompanied by sweating. She describes intermittent runs of tachycardia with mild associated shortness of breath occurring unpredictably. She denies significant dizziness and is uncertain if shortness of breath is related to tachycardia or anxiety. She has a history of multiple COVID-19 infections and is concerned about potential cardiac complications. She is currently seeking cardiology consultation for baseline evaluation (requesting to resend referral).    MEDICATIONS:  She recently discontinued Propranolol 75 mg for migraines due to perceived lack of effectiveness. She started Enlyte supplement for migraines and low vitamin D levels.  She initiated a gluten-free diet and notes improvement in migraine frequency and overall sense of well-being.    FAMILY HISTORY:  She has a significant family history of breast cancer involving her mother, with approximately 30% increased risk necessitating annual mammogram screenings.        Last AWV: 4/29/25      Past Medical History:   Diagnosis Date    Autoimmune  thyroiditis     LYDIA (generalized anxiety disorder)     Hypothyroidism, unspecified     Migraines     Pregnancy induced hypertension     Rh negative status during pregnancy     Thyroid disease     Vitamin D insufficiency 04/29/2025        Past Surgical History:   Procedure Laterality Date    LYMPH NODE BIOPSY      TONSILLECTOMY      WISDOM TOOTH EXTRACTION          Social History     Tobacco Use    Smoking status: Never     Passive exposure: Never    Smokeless tobacco: Never   Substance and Sexual Activity    Alcohol use: Not Currently     Comment: May drink one drink a month    Drug use: Never    Sexual activity: Yes     Partners: Male     Birth control/protection: Abstinence, Rhythm        Current Outpatient Medications   Medication Instructions    iron-FA-dha-epa-FAD-NADH-be-mv (ENLYTE) 1.5 mg iron- 8.73 mg CpID 1 capsule, Oral, Daily    levothyroxine (SYNTHROID) 200 mcg, Oral, Before breakfast    liothyronine (CYTOMEL) 25 mcg, Oral, Daily    UBRELVY 100 mg tablet TAKE 1 TABLET BY MOUTH DAILY AS NEEDED FOR MIGRAINE HEADACHE. IF symptoms persist OR return, MAY REPEAT DOSE AFTER 2 hours. max of 200 MG in 24 hours       Review of patient's allergies indicates:   Allergen Reactions    Penicillins Hives and Swelling    Amoxicillin Hives    Cephalosporins         Patient Care Team:  Lulu Lawrence MD as PCP - General (Internal Medicine)  Gentry Arceo MD as Consulting Physician (Obstetrics and Gynecology)  Fredy Marquez MD as Consulting Physician (Neurology)  Kory Souza MD as Consulting Physician (Endocrinology)  Luke Harris Jr., MD as Consulting Physician (Family Medicine)       Subjective:    Review of Systems   Constitutional:  Negative for activity change and unexpected weight change.   HENT:  Negative for hearing loss, rhinorrhea and trouble swallowing.    Eyes:  Negative for discharge and visual disturbance.   Respiratory:  Positive for chest tightness. Negative for wheezing.   "  Cardiovascular:  Positive for palpitations. Negative for chest pain.   Gastrointestinal:  Negative for blood in stool, constipation, diarrhea and vomiting.   Endocrine: Negative for polydipsia and polyuria.   Genitourinary:  Negative for difficulty urinating, dysuria, hematuria and menstrual problem.   Musculoskeletal:  Negative for arthralgias, joint swelling and neck pain.   Neurological:  Negative for weakness and headaches.   Psychiatric/Behavioral:  Negative for confusion and dysphoric mood.    All other systems reviewed and are negative.      Objective:    Visit Vitals  /80   Pulse 96   Resp 16   Ht 5' 5" (1.651 m)   Wt 129.3 kg (285 lb)   LMP 05/26/2025 (Exact Date)   SpO2 99%   BMI 47.43 kg/m²       Physical Exam    Physical Exam: LIMITED DUE TO TELEMEDICINE RESTRICTIONS.  General: Alert and oriented, No acute distress.  Head: Normocephalic, Atraumatic.  Eye: Sclera non-icteric.  Neck/Thyroid:  Full range of motion.  Respiratory: Non-labored respirations, Symmetrical chest wall expansion.  Musculoskeletal: Normal range of motion.  Integumentary:  No visible suspicious lesions or rashes. No diaphoresis.   Neurologic: No focal deficits  Psychiatric: Normal interaction, Coherent speech, Euthymic mood, Appropriate affect       Assessment:      ICD-10-CM ICD-9-CM   1. Acquired hypothyroidism  E03.9 244.9   2. Palpitations  R00.2 785.1   3. Breast cancer screening by mammogram  Z12.31 V76.12          Plan:    1. Acquired hypothyroidism  Overview:  2021: Thyroid US: IMPRESSION: Heterogeneous and mildly enlarged thyroid with suspected  mild hypervascularity of the right lobe. No defined nodule  appreciated.    Assessment & Plan:  TSH   Date Value Ref Range Status   06/25/2025 0.024 (L) 0.350 - 4.940 uIU/mL Final   04/28/2025 17.744 (H) 0.350 - 4.940 uIU/mL Final   05/15/2024 0.213 (L) 0.350 - 4.940 uIU/mL Final      Currently on Synthroid 225mcg daily and Cytomel 25mcg daily.  Decrease Synthroid to 200mcg " daily and continue Cytomel 25mcg daily.  Repeat TSH in 8-12 weeks.  Take medicine on an empty stomach with water (no other medications or beverages). Wait 30 minutes to eat or drink.  Report any symptoms of thinning hair, breaking nails, fatigue, weight gain or loss, palpitations.       Orders:  -     levothyroxine (SYNTHROID) 200 MCG tablet; Take 1 tablet (200 mcg total) by mouth before breakfast.  Dispense: 30 tablet; Refill: 3  -     TSH; Future; Expected date: 08/21/2025  -     T4, Free; Future; Expected date: 08/21/2025  -     T3; Future; Expected date: 08/21/2025    2. Palpitations  Assessment & Plan:  Recently stopped propranolol.    Continues to have intermittent palpitations.  Referral to cardiology was sent at [facility].  Will resend referral since she has not heard from cardiology office.      3. Breast cancer screening by mammogram  Overview:  Family history of Breast cancer.    Underwent genetic testing.  Gets routine mammograms.    Assessment & Plan:  Last mammogram on 05/15/2024 noted no mammographic evidence of malignancy.    Due for repeat mammogram    Orders:  -     Mammo Digital Screening Bilat w/ Eugenio ([facility]); Future; Expected date: 06/26/2025         Follow up in about 2 months (around 8/26/2025) for Medication change follow up, with labs prior. In addition to their scheduled follow up, the patient has also been instructed to follow up on as needed basis.       No future appointments.         GABBY Hernández      Disclaimer:  This note was generated with the assistance of ambient listening technology. Verbal consent was obtained by the patient and accompanying visitor(s) for the recording of patient appointment to facilitate this note. I attest to having reviewed and edited the generated note for accuracy, though some syntax or spelling errors may persist. Please contact the author of this note for any clarification.

## 2025-06-26 NOTE — TELEPHONE ENCOUNTER
Copied from CRM #[id number]. Topic: General Inquiry - Return Call  >> Jun 26, 2025  8:20 AM Nusrat wrote:  Who Called: Sonia Shelley    Patient is returning phone call    Who Left Message for Patient:Aimee  Does the patient know what this is regarding? Virtual intake      Preferred Method of Contact: Phone Call  Patient's Preferred Phone Number on File: [phone]   Best Call Back Number, if different:  Additional Information:

## 2025-06-26 NOTE — ASSESSMENT & PLAN NOTE
Last mammogram on 05/15/2024 noted no mammographic evidence of malignancy.    Due for repeat mammogram

## 2025-06-26 NOTE — ASSESSMENT & PLAN NOTE
Recently stopped propranolol.    Continues to have intermittent palpitations.  Referral to cardiology was sent at [facility].  Will resend referral since she has not heard from cardiology office.

## 2025-07-14 ENCOUNTER — HOSPITAL ENCOUNTER (OUTPATIENT)
Dept: RADIOLOGY | Facility: HOSPITAL | Age: 35
Discharge: HOME | End: 2025-07-14
Attending: NURSE PRACTITIONER
Payer: COMMERCIAL

## 2025-07-14 DIAGNOSIS — Z12.31 BREAST CANCER SCREENING BY MAMMOGRAM: ICD-10-CM

## 2025-07-14 PROCEDURE — 77063 BREAST TOMOSYNTHESIS BI: CPT | Mod: TC

## 2025-07-15 ENCOUNTER — PATIENT MESSAGE (OUTPATIENT)
Dept: INTERNAL MEDICINE | Facility: CLINIC | Age: 35
End: 2025-07-15
Payer: COMMERCIAL

## 2025-07-15 DIAGNOSIS — G43.009 MIGRAINE WITHOUT AURA AND WITHOUT STATUS MIGRAINOSUS, NOT INTRACTABLE: Primary | ICD-10-CM

## 2025-07-15 RX ORDER — UBROGEPANT 100 MG/1
100 TABLET ORAL
Qty: 8 TABLET | Refills: 5 | Status: SHIPPED | OUTPATIENT
Start: 2025-07-15

## 2025-08-07 ENCOUNTER — HOSPITAL ENCOUNTER (OUTPATIENT)
Dept: RADIOLOGY | Facility: HOSPITAL | Age: 35
Discharge: HOME | End: 2025-08-07
Attending: INTERNAL MEDICINE
Payer: COMMERCIAL

## 2025-08-07 VITALS — WEIGHT: 260 LBS | BODY MASS INDEX: 41.78 KG/M2 | HEIGHT: 66 IN

## 2025-08-07 DIAGNOSIS — R92.8 ABNORMAL MAMMOGRAM: ICD-10-CM

## 2025-08-07 PROCEDURE — 77066 DX MAMMO INCL CAD BI: CPT | Mod: 26,,, | Performed by: RADIOLOGY

## 2025-08-07 PROCEDURE — 76642 ULTRASOUND BREAST LIMITED: CPT | Mod: 26,50,, | Performed by: RADIOLOGY

## 2025-08-07 PROCEDURE — 76642 ULTRASOUND BREAST LIMITED: CPT | Mod: TC,50

## 2025-08-07 PROCEDURE — 77066 DX MAMMO INCL CAD BI: CPT | Mod: TC

## 2025-08-07 PROCEDURE — 77062 BREAST TOMOSYNTHESIS BI: CPT | Mod: 26,,, | Performed by: RADIOLOGY

## 2025-08-21 ENCOUNTER — OFFICE VISIT (OUTPATIENT)
Dept: INTERNAL MEDICINE | Facility: CLINIC | Age: 35
End: 2025-08-21
Payer: COMMERCIAL

## 2025-08-21 VITALS
RESPIRATION RATE: 16 BRPM | SYSTOLIC BLOOD PRESSURE: 128 MMHG | HEART RATE: 85 BPM | DIASTOLIC BLOOD PRESSURE: 70 MMHG | OXYGEN SATURATION: 99 % | BODY MASS INDEX: 44.36 KG/M2 | HEIGHT: 66 IN | WEIGHT: 276 LBS

## 2025-08-21 DIAGNOSIS — B02.8 HERPES ZOSTER WITH OTHER COMPLICATION: ICD-10-CM

## 2025-08-21 DIAGNOSIS — L03.115 CELLULITIS OF RIGHT LOWER EXTREMITY: Primary | ICD-10-CM

## 2025-08-21 PROBLEM — B02.9 SHINGLES: Status: ACTIVE | Noted: 2025-08-21

## 2025-08-21 PROCEDURE — 3044F HG A1C LEVEL LT 7.0%: CPT | Mod: CPTII,,, | Performed by: NURSE PRACTITIONER

## 2025-08-21 PROCEDURE — 1160F RVW MEDS BY RX/DR IN RCRD: CPT | Mod: CPTII,,, | Performed by: NURSE PRACTITIONER

## 2025-08-21 PROCEDURE — 3008F BODY MASS INDEX DOCD: CPT | Mod: CPTII,,, | Performed by: NURSE PRACTITIONER

## 2025-08-21 PROCEDURE — 3078F DIAST BP <80 MM HG: CPT | Mod: CPTII,,, | Performed by: NURSE PRACTITIONER

## 2025-08-21 PROCEDURE — 99213 OFFICE O/P EST LOW 20 MIN: CPT | Mod: ,,, | Performed by: NURSE PRACTITIONER

## 2025-08-21 PROCEDURE — 3074F SYST BP LT 130 MM HG: CPT | Mod: CPTII,,, | Performed by: NURSE PRACTITIONER

## 2025-08-21 PROCEDURE — 1159F MED LIST DOCD IN RCRD: CPT | Mod: CPTII,,, | Performed by: NURSE PRACTITIONER

## 2025-08-21 RX ORDER — VALACYCLOVIR HYDROCHLORIDE 1 G/1
1000 TABLET, FILM COATED ORAL 3 TIMES DAILY
Qty: 21 TABLET | Refills: 0 | Status: SHIPPED | OUTPATIENT
Start: 2025-08-21 | End: 2025-08-28

## 2025-08-21 RX ORDER — CLINDAMYCIN HYDROCHLORIDE 300 MG/1
300 CAPSULE ORAL 3 TIMES DAILY
Qty: 30 CAPSULE | Refills: 0 | Status: SHIPPED | OUTPATIENT
Start: 2025-08-21 | End: 2025-08-31

## 2025-08-21 RX ORDER — MUPIROCIN 20 MG/G
OINTMENT TOPICAL 3 TIMES DAILY
Qty: 22 G | Refills: 1 | Status: SHIPPED | OUTPATIENT
Start: 2025-08-21

## 2025-08-25 ENCOUNTER — PATIENT MESSAGE (OUTPATIENT)
Dept: INTERNAL MEDICINE | Facility: CLINIC | Age: 35
End: 2025-08-25
Payer: COMMERCIAL

## 2025-08-26 ENCOUNTER — OFFICE VISIT (OUTPATIENT)
Dept: INTERNAL MEDICINE | Facility: CLINIC | Age: 35
End: 2025-08-26
Payer: COMMERCIAL

## 2025-08-26 VITALS
DIASTOLIC BLOOD PRESSURE: 78 MMHG | RESPIRATION RATE: 16 BRPM | BODY MASS INDEX: 44.03 KG/M2 | OXYGEN SATURATION: 99 % | WEIGHT: 274 LBS | HEART RATE: 100 BPM | SYSTOLIC BLOOD PRESSURE: 120 MMHG | HEIGHT: 66 IN

## 2025-08-26 DIAGNOSIS — B02.8 HERPES ZOSTER WITH OTHER COMPLICATION: Primary | ICD-10-CM

## 2025-08-26 DIAGNOSIS — L03.115 CELLULITIS OF RIGHT LOWER EXTREMITY: ICD-10-CM

## 2025-08-26 PROCEDURE — 99214 OFFICE O/P EST MOD 30 MIN: CPT | Mod: ,,, | Performed by: INTERNAL MEDICINE

## 2025-08-26 PROCEDURE — 3044F HG A1C LEVEL LT 7.0%: CPT | Mod: CPTII,,, | Performed by: INTERNAL MEDICINE

## 2025-08-26 PROCEDURE — 1160F RVW MEDS BY RX/DR IN RCRD: CPT | Mod: CPTII,,, | Performed by: INTERNAL MEDICINE

## 2025-08-26 PROCEDURE — 1159F MED LIST DOCD IN RCRD: CPT | Mod: CPTII,,, | Performed by: INTERNAL MEDICINE

## 2025-08-26 PROCEDURE — 3008F BODY MASS INDEX DOCD: CPT | Mod: CPTII,,, | Performed by: INTERNAL MEDICINE

## 2025-08-26 PROCEDURE — 3074F SYST BP LT 130 MM HG: CPT | Mod: CPTII,,, | Performed by: INTERNAL MEDICINE

## 2025-08-26 PROCEDURE — 3078F DIAST BP <80 MM HG: CPT | Mod: CPTII,,, | Performed by: INTERNAL MEDICINE

## 2025-08-26 RX ORDER — VALACYCLOVIR HYDROCHLORIDE 1 G/1
1000 TABLET, FILM COATED ORAL 3 TIMES DAILY
Qty: 21 TABLET | Refills: 0 | Status: SHIPPED | OUTPATIENT
Start: 2025-08-26 | End: 2025-09-02

## 2025-08-27 ENCOUNTER — LAB VISIT (OUTPATIENT)
Dept: LAB | Facility: HOSPITAL | Age: 35
End: 2025-08-27
Attending: NURSE PRACTITIONER
Payer: COMMERCIAL

## 2025-08-27 DIAGNOSIS — B02.8 HERPES ZOSTER WITH OTHER COMPLICATION: ICD-10-CM

## 2025-08-27 PROCEDURE — 36415 COLL VENOUS BLD VENIPUNCTURE: CPT

## 2025-08-27 PROCEDURE — 86787 VARICELLA-ZOSTER ANTIBODY: CPT

## 2025-08-28 ENCOUNTER — LAB REQUISITION (OUTPATIENT)
Dept: LAB | Facility: HOSPITAL | Age: 35
End: 2025-08-28
Payer: COMMERCIAL

## 2025-08-28 DIAGNOSIS — L30.9 DERMATITIS, UNSPECIFIED: ICD-10-CM

## 2025-08-28 PROCEDURE — 87070 CULTURE OTHR SPECIMN AEROBIC: CPT | Performed by: PHYSICIAN ASSISTANT

## 2025-08-29 LAB
VZV IGG SER IA-ACNC: 2.1
VZV IGG SER QL IA: POSITIVE

## 2025-08-31 LAB — BACTERIA WND CULT: NORMAL

## 2025-09-03 ENCOUNTER — RESULTS FOLLOW-UP (OUTPATIENT)
Dept: INTERNAL MEDICINE | Facility: CLINIC | Age: 35
End: 2025-09-03
Payer: COMMERCIAL